# Patient Record
Sex: MALE | Race: WHITE | NOT HISPANIC OR LATINO | Employment: FULL TIME | URBAN - METROPOLITAN AREA
[De-identification: names, ages, dates, MRNs, and addresses within clinical notes are randomized per-mention and may not be internally consistent; named-entity substitution may affect disease eponyms.]

---

## 2017-09-25 ENCOUNTER — COMMUNICATION - HEALTHEAST (OUTPATIENT)
Dept: FAMILY MEDICINE | Facility: CLINIC | Age: 62
End: 2017-09-25

## 2018-02-26 ENCOUNTER — RECORDS - HEALTHEAST (OUTPATIENT)
Dept: ADMINISTRATIVE | Facility: OTHER | Age: 63
End: 2018-02-26

## 2018-03-01 ENCOUNTER — RECORDS - HEALTHEAST (OUTPATIENT)
Dept: ADMINISTRATIVE | Facility: OTHER | Age: 63
End: 2018-03-01

## 2018-03-05 ENCOUNTER — COMMUNICATION - HEALTHEAST (OUTPATIENT)
Dept: FAMILY MEDICINE | Facility: CLINIC | Age: 63
End: 2018-03-05

## 2018-03-15 ENCOUNTER — COMMUNICATION - HEALTHEAST (OUTPATIENT)
Dept: FAMILY MEDICINE | Facility: CLINIC | Age: 63
End: 2018-03-15

## 2018-03-15 ENCOUNTER — OFFICE VISIT - HEALTHEAST (OUTPATIENT)
Dept: FAMILY MEDICINE | Facility: CLINIC | Age: 63
End: 2018-03-15

## 2018-03-15 DIAGNOSIS — M19.90 DJD (DEGENERATIVE JOINT DISEASE): ICD-10-CM

## 2018-03-15 LAB
ANION GAP SERPL CALCULATED.3IONS-SCNC: 8 MMOL/L (ref 5–18)
BUN SERPL-MCNC: 18 MG/DL (ref 8–22)
CALCIUM SERPL-MCNC: 9.7 MG/DL (ref 8.5–10.5)
CHLORIDE BLD-SCNC: 104 MMOL/L (ref 98–107)
CO2 SERPL-SCNC: 26 MMOL/L (ref 22–31)
CREAT SERPL-MCNC: 0.81 MG/DL (ref 0.7–1.3)
ERYTHROCYTE [DISTWIDTH] IN BLOOD BY AUTOMATED COUNT: 11.3 % (ref 11–14.5)
GFR SERPL CREATININE-BSD FRML MDRD: >60 ML/MIN/1.73M2
GLUCOSE BLD-MCNC: 100 MG/DL (ref 70–125)
HCT VFR BLD AUTO: 45.5 % (ref 40–54)
HGB BLD-MCNC: 15.7 G/DL (ref 14–18)
INR PPP: 1.09 (ref 0.9–1.1)
MCH RBC QN AUTO: 32.6 PG (ref 27–34)
MCHC RBC AUTO-ENTMCNC: 34.6 G/DL (ref 32–36)
MCV RBC AUTO: 94 FL (ref 80–100)
PLATELET # BLD AUTO: 188 THOU/UL (ref 140–440)
PMV BLD AUTO: 6.8 FL (ref 7–10)
POTASSIUM BLD-SCNC: 4.2 MMOL/L (ref 3.5–5)
RBC # BLD AUTO: 4.82 MILL/UL (ref 4.4–6.2)
SODIUM SERPL-SCNC: 138 MMOL/L (ref 136–145)
WBC: 4.1 THOU/UL (ref 4–11)

## 2018-03-15 ASSESSMENT — MIFFLIN-ST. JEOR: SCORE: 1568.4

## 2018-03-28 ENCOUNTER — RECORDS - HEALTHEAST (OUTPATIENT)
Dept: ADMINISTRATIVE | Facility: OTHER | Age: 63
End: 2018-03-28

## 2018-05-14 ENCOUNTER — RECORDS - HEALTHEAST (OUTPATIENT)
Dept: LAB | Facility: CLINIC | Age: 63
End: 2018-05-14

## 2018-05-14 ENCOUNTER — RECORDS - HEALTHEAST (OUTPATIENT)
Dept: ADMINISTRATIVE | Facility: OTHER | Age: 63
End: 2018-05-14

## 2018-05-14 LAB
C REACTIVE PROTEIN LHE: 0.1 MG/DL (ref 0–0.8)
ERYTHROCYTE [SEDIMENTATION RATE] IN BLOOD BY WESTERGREN METHOD: 9 MM/HR (ref 0–15)

## 2018-05-15 ENCOUNTER — COMMUNICATION - HEALTHEAST (OUTPATIENT)
Dept: TELEHEALTH | Facility: CLINIC | Age: 63
End: 2018-05-15

## 2018-05-15 ENCOUNTER — HOSPITAL ENCOUNTER (OUTPATIENT)
Dept: ULTRASOUND IMAGING | Facility: CLINIC | Age: 63
Discharge: HOME | End: 2018-05-15
Attending: PHYSICIAN ASSISTANT

## 2018-05-15 DIAGNOSIS — R60.0 EDEMA LEG: ICD-10-CM

## 2018-05-23 ENCOUNTER — RECORDS - HEALTHEAST (OUTPATIENT)
Dept: ADMINISTRATIVE | Facility: OTHER | Age: 63
End: 2018-05-23

## 2018-06-28 ENCOUNTER — RECORDS - HEALTHEAST (OUTPATIENT)
Dept: ADMINISTRATIVE | Facility: OTHER | Age: 63
End: 2018-06-28

## 2018-07-05 ENCOUNTER — OFFICE VISIT - HEALTHEAST (OUTPATIENT)
Dept: FAMILY MEDICINE | Facility: CLINIC | Age: 63
End: 2018-07-05

## 2018-07-05 DIAGNOSIS — J81.0 ACUTE EDEMA OF LUNG (H): ICD-10-CM

## 2018-07-05 DIAGNOSIS — M79.2 NERVE PAIN: ICD-10-CM

## 2018-07-08 ENCOUNTER — COMMUNICATION - HEALTHEAST (OUTPATIENT)
Dept: FAMILY MEDICINE | Facility: CLINIC | Age: 63
End: 2018-07-08

## 2018-07-09 ENCOUNTER — COMMUNICATION - HEALTHEAST (OUTPATIENT)
Dept: FAMILY MEDICINE | Facility: CLINIC | Age: 63
End: 2018-07-09

## 2018-07-09 ENCOUNTER — COMMUNICATION - HEALTHEAST (OUTPATIENT)
Dept: ADMINISTRATIVE | Facility: CLINIC | Age: 63
End: 2018-07-09

## 2018-07-09 DIAGNOSIS — M79.606 CHRONIC LEG PAIN: ICD-10-CM

## 2018-07-09 DIAGNOSIS — G89.29 CHRONIC LEG PAIN: ICD-10-CM

## 2018-07-11 ENCOUNTER — COMMUNICATION - HEALTHEAST (OUTPATIENT)
Dept: FAMILY MEDICINE | Facility: CLINIC | Age: 63
End: 2018-07-11

## 2018-07-11 ENCOUNTER — OFFICE VISIT - HEALTHEAST (OUTPATIENT)
Dept: PHYSICAL THERAPY | Facility: REHABILITATION | Age: 63
End: 2018-07-11

## 2018-07-11 DIAGNOSIS — M25.552 LEFT HIP PAIN: ICD-10-CM

## 2018-07-11 DIAGNOSIS — M79.662 PAIN OF LEFT LOWER LEG: ICD-10-CM

## 2018-07-11 DIAGNOSIS — R26.89 ANTALGIC GAIT: ICD-10-CM

## 2018-07-11 DIAGNOSIS — M62.81 GENERALIZED MUSCLE WEAKNESS: ICD-10-CM

## 2018-07-12 ENCOUNTER — HOSPITAL ENCOUNTER (OUTPATIENT)
Dept: PALLIATIVE MEDICINE | Facility: OTHER | Age: 63
Discharge: HOME | End: 2018-07-12
Attending: PAIN MEDICINE | Admitting: PAIN MEDICINE

## 2018-07-12 DIAGNOSIS — G57.12 MERALGIA PARESTHETICA OF LEFT SIDE: ICD-10-CM

## 2018-07-12 DIAGNOSIS — G57.10 MERALGIA PARESTHETICA: ICD-10-CM

## 2018-07-12 ASSESSMENT — MIFFLIN-ST. JEOR: SCORE: 1540.61

## 2018-07-13 ENCOUNTER — COMMUNICATION - HEALTHEAST (OUTPATIENT)
Dept: PALLIATIVE MEDICINE | Facility: OTHER | Age: 63
End: 2018-07-13

## 2018-07-16 ENCOUNTER — COMMUNICATION - HEALTHEAST (OUTPATIENT)
Dept: FAMILY MEDICINE | Facility: CLINIC | Age: 63
End: 2018-07-16

## 2018-07-16 DIAGNOSIS — M79.2 NERVE PAIN: ICD-10-CM

## 2018-07-17 ENCOUNTER — COMMUNICATION - HEALTHEAST (OUTPATIENT)
Dept: FAMILY MEDICINE | Facility: CLINIC | Age: 63
End: 2018-07-17

## 2018-07-17 DIAGNOSIS — M79.2 NERVE PAIN: ICD-10-CM

## 2018-07-25 ENCOUNTER — OFFICE VISIT - HEALTHEAST (OUTPATIENT)
Dept: PHYSICAL THERAPY | Facility: REHABILITATION | Age: 63
End: 2018-07-25

## 2018-07-25 DIAGNOSIS — M62.81 GENERALIZED MUSCLE WEAKNESS: ICD-10-CM

## 2018-07-25 DIAGNOSIS — M25.552 LEFT HIP PAIN: ICD-10-CM

## 2018-07-25 DIAGNOSIS — R26.89 ANTALGIC GAIT: ICD-10-CM

## 2018-07-26 ENCOUNTER — OFFICE VISIT - HEALTHEAST (OUTPATIENT)
Dept: FAMILY MEDICINE | Facility: CLINIC | Age: 63
End: 2018-07-26

## 2018-07-26 DIAGNOSIS — G62.9 NEUROPATHY: ICD-10-CM

## 2018-07-26 ASSESSMENT — MIFFLIN-ST. JEOR: SCORE: 1536.36

## 2018-07-30 ENCOUNTER — OFFICE VISIT - HEALTHEAST (OUTPATIENT)
Dept: PHYSICAL THERAPY | Facility: REHABILITATION | Age: 63
End: 2018-07-30

## 2018-07-30 DIAGNOSIS — M62.81 GENERALIZED MUSCLE WEAKNESS: ICD-10-CM

## 2018-07-30 DIAGNOSIS — M25.552 LEFT HIP PAIN: ICD-10-CM

## 2018-07-30 DIAGNOSIS — R26.89 ANTALGIC GAIT: ICD-10-CM

## 2018-08-01 ENCOUNTER — COMMUNICATION - HEALTHEAST (OUTPATIENT)
Dept: FAMILY MEDICINE | Facility: CLINIC | Age: 63
End: 2018-08-01

## 2018-08-01 ENCOUNTER — AMBULATORY - HEALTHEAST (OUTPATIENT)
Dept: FAMILY MEDICINE | Facility: CLINIC | Age: 63
End: 2018-08-01

## 2018-08-01 DIAGNOSIS — J81.0 ACUTE EDEMA OF LUNG (H): ICD-10-CM

## 2018-08-06 ENCOUNTER — OFFICE VISIT - HEALTHEAST (OUTPATIENT)
Dept: PHYSICAL THERAPY | Facility: REHABILITATION | Age: 63
End: 2018-08-06

## 2018-08-06 DIAGNOSIS — M62.81 GENERALIZED MUSCLE WEAKNESS: ICD-10-CM

## 2018-08-06 DIAGNOSIS — R26.89 ANTALGIC GAIT: ICD-10-CM

## 2018-08-06 DIAGNOSIS — M25.552 LEFT HIP PAIN: ICD-10-CM

## 2018-08-09 ENCOUNTER — RECORDS - HEALTHEAST (OUTPATIENT)
Dept: ADMINISTRATIVE | Facility: OTHER | Age: 63
End: 2018-08-09

## 2018-09-02 ENCOUNTER — COMMUNICATION - HEALTHEAST (OUTPATIENT)
Dept: FAMILY MEDICINE | Facility: CLINIC | Age: 63
End: 2018-09-02

## 2018-09-05 ENCOUNTER — OFFICE VISIT - HEALTHEAST (OUTPATIENT)
Dept: PHYSICAL THERAPY | Facility: REHABILITATION | Age: 63
End: 2018-09-05

## 2018-09-05 DIAGNOSIS — M62.81 GENERALIZED MUSCLE WEAKNESS: ICD-10-CM

## 2018-09-05 DIAGNOSIS — R26.89 ANTALGIC GAIT: ICD-10-CM

## 2018-09-05 DIAGNOSIS — M25.552 LEFT HIP PAIN: ICD-10-CM

## 2018-09-24 ENCOUNTER — OFFICE VISIT - HEALTHEAST (OUTPATIENT)
Dept: FAMILY MEDICINE | Facility: CLINIC | Age: 63
End: 2018-09-24

## 2018-09-24 DIAGNOSIS — R60.0 LEG EDEMA, LEFT: ICD-10-CM

## 2018-09-24 DIAGNOSIS — G57.12 MERALGIA PARESTHETICA OF LEFT SIDE: ICD-10-CM

## 2018-09-24 DIAGNOSIS — L21.9 SEBORRHEIC DERMATITIS: ICD-10-CM

## 2018-09-24 LAB
ANION GAP SERPL CALCULATED.3IONS-SCNC: 7 MMOL/L (ref 5–18)
BUN SERPL-MCNC: 16 MG/DL (ref 8–22)
CALCIUM SERPL-MCNC: 9.7 MG/DL (ref 8.5–10.5)
CHLORIDE BLD-SCNC: 108 MMOL/L (ref 98–107)
CO2 SERPL-SCNC: 29 MMOL/L (ref 22–31)
CREAT SERPL-MCNC: 0.99 MG/DL (ref 0.7–1.3)
GFR SERPL CREATININE-BSD FRML MDRD: >60 ML/MIN/1.73M2
GLUCOSE BLD-MCNC: 96 MG/DL (ref 70–125)
POTASSIUM BLD-SCNC: 4 MMOL/L (ref 3.5–5)
SODIUM SERPL-SCNC: 144 MMOL/L (ref 136–145)

## 2018-09-25 ENCOUNTER — COMMUNICATION - HEALTHEAST (OUTPATIENT)
Dept: FAMILY MEDICINE | Facility: CLINIC | Age: 63
End: 2018-09-25

## 2018-09-25 DIAGNOSIS — L21.9 SEBORRHEIC DERMATITIS: ICD-10-CM

## 2018-09-27 ENCOUNTER — COMMUNICATION - HEALTHEAST (OUTPATIENT)
Dept: PALLIATIVE MEDICINE | Facility: CLINIC | Age: 63
End: 2018-09-27

## 2018-10-01 ENCOUNTER — COMMUNICATION - HEALTHEAST (OUTPATIENT)
Dept: FAMILY MEDICINE | Facility: CLINIC | Age: 63
End: 2018-10-01

## 2018-10-01 ENCOUNTER — COMMUNICATION - HEALTHEAST (OUTPATIENT)
Dept: PALLIATIVE MEDICINE | Facility: OTHER | Age: 63
End: 2018-10-01

## 2018-11-29 ENCOUNTER — RECORDS - HEALTHEAST (OUTPATIENT)
Dept: ADMINISTRATIVE | Facility: OTHER | Age: 63
End: 2018-11-29

## 2019-04-26 ENCOUNTER — COMMUNICATION - HEALTHEAST (OUTPATIENT)
Dept: FAMILY MEDICINE | Facility: CLINIC | Age: 64
End: 2019-04-26

## 2019-04-26 DIAGNOSIS — L21.9 SEBORRHEIC DERMATITIS: ICD-10-CM

## 2019-05-09 ENCOUNTER — RECORDS - HEALTHEAST (OUTPATIENT)
Dept: ADMINISTRATIVE | Facility: OTHER | Age: 64
End: 2019-05-09

## 2019-08-08 ENCOUNTER — COMMUNICATION - HEALTHEAST (OUTPATIENT)
Dept: FAMILY MEDICINE | Facility: CLINIC | Age: 64
End: 2019-08-08

## 2019-08-08 ENCOUNTER — OFFICE VISIT - HEALTHEAST (OUTPATIENT)
Dept: FAMILY MEDICINE | Facility: CLINIC | Age: 64
End: 2019-08-08

## 2019-08-08 DIAGNOSIS — K62.5 RECTAL BLEEDING: ICD-10-CM

## 2019-08-08 LAB
ANION GAP SERPL CALCULATED.3IONS-SCNC: 10 MMOL/L (ref 5–18)
BASOPHILS # BLD AUTO: 0 THOU/UL (ref 0–0.2)
BASOPHILS NFR BLD AUTO: 0 % (ref 0–2)
BUN SERPL-MCNC: 18 MG/DL (ref 8–22)
CHLORIDE BLD-SCNC: 103 MMOL/L (ref 98–107)
CO2 SERPL-SCNC: 28 MMOL/L (ref 22–31)
CREAT SERPL-MCNC: 1 MG/DL (ref 0.8–1.5)
EOSINOPHIL # BLD AUTO: 0.1 THOU/UL (ref 0–0.4)
EOSINOPHIL NFR BLD AUTO: 2 % (ref 0–6)
ERYTHROCYTE [DISTWIDTH] IN BLOOD BY AUTOMATED COUNT: 11.5 % (ref 11–14.5)
GLUCOSE BLD-MCNC: 109 MG/DL (ref 70–125)
HCT VFR BLD AUTO: 42.1 % (ref 40–54)
HGB BLD-MCNC: 14.4 G/DL (ref 14–18)
LYMPHOCYTES # BLD AUTO: 0.7 THOU/UL (ref 0.8–4.4)
LYMPHOCYTES NFR BLD AUTO: 13 % (ref 20–40)
MCH RBC QN AUTO: 32.7 PG (ref 27–34)
MCHC RBC AUTO-ENTMCNC: 34.3 G/DL (ref 32–36)
MCV RBC AUTO: 96 FL (ref 80–100)
MONOCYTES # BLD AUTO: 0.3 THOU/UL (ref 0–0.9)
MONOCYTES NFR BLD AUTO: 5 % (ref 2–10)
NEUTROPHILS # BLD AUTO: 4.3 THOU/UL (ref 2–7.7)
NEUTROPHILS NFR BLD AUTO: 80 % (ref 50–70)
PLATELET # BLD AUTO: 157 THOU/UL (ref 140–440)
PMV BLD AUTO: 6.9 FL (ref 7–10)
POTASSIUM BLD-SCNC: 3.8 MMOL/L (ref 3.5–5.5)
RBC # BLD AUTO: 4.41 MILL/UL (ref 4.4–6.2)
SODIUM SERPL-SCNC: 141 MMOL/L (ref 136–145)
WBC: 5.4 THOU/UL (ref 4–11)

## 2019-09-26 ENCOUNTER — RECORDS - HEALTHEAST (OUTPATIENT)
Dept: ADMINISTRATIVE | Facility: OTHER | Age: 64
End: 2019-09-26

## 2020-03-12 ENCOUNTER — OFFICE VISIT - HEALTHEAST (OUTPATIENT)
Dept: FAMILY MEDICINE | Facility: CLINIC | Age: 65
End: 2020-03-12

## 2020-03-12 DIAGNOSIS — L98.9 SKIN LESION: ICD-10-CM

## 2020-03-12 ASSESSMENT — MIFFLIN-ST. JEOR: SCORE: 1549.69

## 2020-06-05 ENCOUNTER — RECORDS - HEALTHEAST (OUTPATIENT)
Dept: ADMINISTRATIVE | Facility: OTHER | Age: 65
End: 2020-06-05

## 2020-06-05 ENCOUNTER — COMMUNICATION - HEALTHEAST (OUTPATIENT)
Dept: FAMILY MEDICINE | Facility: CLINIC | Age: 65
End: 2020-06-05

## 2020-06-05 DIAGNOSIS — M79.672 LEFT FOOT PAIN: ICD-10-CM

## 2020-06-05 DIAGNOSIS — M79.673 PAIN OF FOOT, UNSPECIFIED LATERALITY: ICD-10-CM

## 2020-06-23 ENCOUNTER — OFFICE VISIT - HEALTHEAST (OUTPATIENT)
Dept: PODIATRY | Facility: CLINIC | Age: 65
End: 2020-06-23

## 2020-06-23 DIAGNOSIS — M79.89 SWELLING OF LIMB: ICD-10-CM

## 2020-06-23 DIAGNOSIS — L57.0 KERATOMA: ICD-10-CM

## 2020-06-23 DIAGNOSIS — M21.6X2 PRONATION DEFORMITY OF BOTH FEET: ICD-10-CM

## 2020-06-23 DIAGNOSIS — M21.6X1 PRONATION DEFORMITY OF BOTH FEET: ICD-10-CM

## 2020-08-26 ENCOUNTER — COMMUNICATION - HEALTHEAST (OUTPATIENT)
Dept: FAMILY MEDICINE | Facility: CLINIC | Age: 65
End: 2020-08-26

## 2021-02-03 ENCOUNTER — RECORDS - HEALTHEAST (OUTPATIENT)
Dept: ADMINISTRATIVE | Facility: OTHER | Age: 66
End: 2021-02-03

## 2021-02-17 ENCOUNTER — RECORDS - HEALTHEAST (OUTPATIENT)
Dept: ADMINISTRATIVE | Facility: OTHER | Age: 66
End: 2021-02-17

## 2021-02-25 ENCOUNTER — RECORDS - HEALTHEAST (OUTPATIENT)
Dept: ADMINISTRATIVE | Facility: OTHER | Age: 66
End: 2021-02-25

## 2021-05-25 ENCOUNTER — RECORDS - HEALTHEAST (OUTPATIENT)
Dept: ADMINISTRATIVE | Facility: CLINIC | Age: 66
End: 2021-05-25

## 2021-05-27 VITALS
HEART RATE: 58 BPM | SYSTOLIC BLOOD PRESSURE: 142 MMHG | DIASTOLIC BLOOD PRESSURE: 70 MMHG | RESPIRATION RATE: 18 BRPM | TEMPERATURE: 97.7 F

## 2021-05-28 NOTE — TELEPHONE ENCOUNTER
RN cannot approve Refill Request    RN can NOT refill this medication med is not covered by policy/route to provider. Last office visit: 9/24/2018 Mercedes Elliott MD Last Physical: Visit date not found Last MTM visit: Visit date not found Last visit same specialty: 9/24/2018 Mercedes Elliott MD.  Next visit within 3 mo: Visit date not found  Next physical within 3 mo: Visit date not found      Carrie Boyer, [facility] Triage/Med Refill 4/27/2019    Requested Prescriptions   Pending Prescriptions Disp Refills     ketoconazole (NIZORAL) 2 % shampoo [Pharmacy Med Name: Ketoconazole External Shampoo 2 %] 120 mL 0     Sig: Apply to damp facial hair, lather, leave on 5 minutes, and rinse daily until symptoms resolved, then weekly       There is no refill protocol information for this order

## 2021-05-31 NOTE — PROGRESS NOTES
[facility]    CHIEF COMPLAINT/REASON FOR VISIT:  Chief Complaint   Patient presents with     Rectal Bleeding     started this morning       HISTORY:      HPI: Cristian is a 63 y.o. male who  has a past medical history of Chondromalacia of patella, Osteoarthrosis, unspecified whether generalized or localized, pelvic region and thigh, and Primary localized osteoarthrosis, lower leg. Cristian presents for evaluation of bright red rectal bleeding. He states he had a bowel movement this morning and when he wiped there was a moderate amount of bright red blood on the toilet paper. There was not any blood noted in the toilet water or on the stool. He has not had a history of rectal bleeding, anal fissure or hemorrhoids. No history of rectal or colon cancer. He has had colonscopy in the past and it was negative 5 years ago. He states there is not any pain. Bowel movements have been regular, he has not had any constipation. Bowel movements are daily. He has been watching what he eats--has been eating a lot of salads and does not eat breads. Cristian does occasionally take naproxen for musculoskeletal pains and aches. He did take some approximately 3 days ago. Cristian denies any other concerns including fevers/chills, cough or cold symptoms, headaches, vision changes, chest pain/pressure, difficulty breathing, SOB, abdominal pain, nausea, vomiting, diarrhea, dysuria, increasing weakness, increasing pain.     Past Medical History:   Diagnosis Date     Chondromalacia of patella      Osteoarthrosis, unspecified whether generalized or localized, pelvic region and thigh      Primary localized osteoarthrosis, lower leg              Family History   Problem Relation Age of Onset     COPD Brother      Alcohol abuse Brother      Drug abuse Brother      Coronary artery disease Father 54     Atrial fibrillation Father      Coronary artery disease Mother      Arthritis Mother      Arthritis Maternal Aunt      Arthritis  Sister      Social History     Socioeconomic History     Marital status:      Spouse name: None     Number of children: None     Years of education: None     Highest education level: None   Occupational History     None   Social Needs     Financial resource strain: None     Food insecurity:     Worry: None     Inability: None     Transportation needs:     Medical: None     Non-medical: None   Tobacco Use     Smoking status: Never Smoker     Smokeless tobacco: Never Used   Substance and Sexual Activity     Alcohol use: None     Drug use: None     Sexual activity: None   Lifestyle     Physical activity:     Days per week: None     Minutes per session: None     Stress: None   Relationships     Social connections:     Talks on phone: None     Gets together: None     Attends Confucianism service: None     Active member of club or organization: None     Attends meetings of clubs or organizations: None     Relationship status: None     Intimate partner violence:     Fear of current or ex partner: None     Emotionally abused: None     Physically abused: None     Forced sexual activity: None   Other Topics Concern     None   Social History Narrative     None       REVIEW OF SYSTEM:  Per HPI    PHYSICAL EXAM:   /64 (Patient Site: Right Arm, Patient Position: Sitting, Cuff Size: Adult Large)   Pulse 61   Temp 98.4  F (36.9  C) (Oral)   Resp 16   Wt 170 lb (77.1 kg)   SpO2 96%   BMI 25.85 kg/m    General appearance: alert, appears stated age and cooperative  HEENT: Head is normocephalic with normal hair distribution. No evidence of trauma. Ears: Without lesions or deformity. No acute purulent discharge. Eyes: Conjunctivae pink with no scleral icterus or erythema. Nose: Normal mucosa and septum. Oropharnyx: mmm, no lesions present.  Lungs: clear to auscultation bilaterally, respirations without effort  Heart: regular rate and rhythm, S1, S2 normal, no murmur, click, rub or gallop  Abdomen: soft, non-tender; bowel  sounds normal; no masses,  no organomegaly  Rectal Exam: normal anus--no fissures, hemorrhoids, skin tags. No evidence of internal hemorrhoids with digital examination. No blood or stool in rectum during rectal examination.   Extremities: extremities normal, atraumatic, no cyanosis or edema  Pulses: 2+ and symmetric  Skin: Skin color, texture, turgor normal. No rashes or lesions  Neurologic: Grossly normal   Psych: interacts well with caregivers, exhibits logical thought processes and connections, pleasant      LABS:   CBC, istat Chem 7  Guaiac     ASSESSMENT:      ICD-10-CM    1. Rectal bleeding K62.5        PLAN:    Rectal Bleeding  -CBC, Chem 7.   -Unable to collect Guaiac due to not having stool or blood in rectum.   -FOBT at next visit.   -Follow up in the next week, especially if there are any symptoms.     All questions answered to patient's satisfaction. No further questions posed, no comments or issues to report. Patient advised to return to primary care provider, urgent care or ER with any further complaints, issues or concerns.    Electronically signed by: Criss Macias CNP

## 2021-06-01 ENCOUNTER — RECORDS - HEALTHEAST (OUTPATIENT)
Dept: ADMINISTRATIVE | Facility: CLINIC | Age: 66
End: 2021-06-01

## 2021-06-01 VITALS — HEIGHT: 68 IN | BODY MASS INDEX: 25.62 KG/M2 | WEIGHT: 169.06 LBS

## 2021-06-01 VITALS — WEIGHT: 170 LBS | HEIGHT: 68 IN | BODY MASS INDEX: 25.76 KG/M2

## 2021-06-01 VITALS — WEIGHT: 176.13 LBS | BODY MASS INDEX: 26.69 KG/M2 | HEIGHT: 68 IN

## 2021-06-01 VITALS — WEIGHT: 172.6 LBS | BODY MASS INDEX: 26.24 KG/M2

## 2021-06-02 VITALS — BODY MASS INDEX: 26.94 KG/M2 | WEIGHT: 177.2 LBS

## 2021-06-03 VITALS — BODY MASS INDEX: 25.85 KG/M2 | WEIGHT: 170 LBS

## 2021-06-04 VITALS
DIASTOLIC BLOOD PRESSURE: 72 MMHG | HEIGHT: 68 IN | TEMPERATURE: 98 F | WEIGHT: 172 LBS | BODY MASS INDEX: 26.07 KG/M2 | SYSTOLIC BLOOD PRESSURE: 128 MMHG | HEART RATE: 52 BPM | OXYGEN SATURATION: 98 % | RESPIRATION RATE: 16 BRPM

## 2021-06-06 NOTE — PROGRESS NOTES
"Chief Complaint   Patient presents with     Foot Problem     Pt c/o poss calous on L foot, very painful       HPI: Patient presents with a growth on his left foot plantar surface.  There is 2 areas are causing difficulty and is been going on for over a month.  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    ROS: No foot pain other than at the plantar surface.  No paresthesias distally.    SH: Reviewed-see Snapshot for review.     FH: Reviewed-see Snapshot for review.    Meds:  Cristian currently has no medications in their medication list.    O:  /72   Pulse (!) 52   Temp 98  F (36.7  C)   Resp 16   Ht 5' 8\" (1.727 m)   Wt 172 lb (78 kg)   SpO2 98%   BMI 26.15 kg/m    Constitutional:    --Vitals as above  --No acute distress  Eyes-  --Sclera noninjected  --Lids and conjunctiva normal  Skin-  --Pink and dry except for the left foot plantar surface has 2 large areas of dry flaky deep lesions consistent with possible plantar wart but cannot rule out simple callus formation.    A/P:   1. Skin lesion  Both lesions on the foot treated with cryotherapy.  If not improving will refer to podiatry.    "

## 2021-06-09 NOTE — PROGRESS NOTES
FOOT AND ANKLE SURGERY/PODIATRY CONSULT NOTE         ASSESSMENT:   Intractable plantar keratoma left foot  Pronation deformity  Lymphedema both lower extremities      TREATMENT:  Debrided hyperkeratotic lesion left foot.  I have recommended orthotics.  Also recommended the patient begin to wear Jobst stockings to assist with his mild lymphedema.  He is to return to the clinic as needed.        HPI: I was asked to see Cristian Mercer today to evaluate and treat a painful wart on the bottom of the left foot.  The patient stated that his primary care physician did treat the area with liquid nitrogen.  Following the treatment the patient stated he had minimal improvement.  He has had this problem for several months.  The area is quite painful with weightbearing and ambulation.  He denies any trauma to the left foot.  He has not noticed any associated redness or swelling.  The pain is relieved with nonweightbearing.  The patient also complains of swelling involving the left foot..  The patient was seen in consultation at the request of Shashi Ba MD for evaluation and treatment of warts..     Past Medical History:   Diagnosis Date     Chondromalacia of patella      Osteoarthrosis, unspecified whether generalized or localized, pelvic region and thigh      Primary localized osteoarthrosis, lower leg        Past Surgical History:   Procedure Laterality Date     NC EXCIS CERV DISK,ONE LEVEL      Description: Laminectomy With Disc Removal;  Proc Date: 10/11/2001;     SPINE SURGERY         No Known Allergies    No current outpatient medications on file.    Family History   Problem Relation Age of Onset     COPD Brother      Alcohol abuse Brother      Drug abuse Brother      Coronary artery disease Father 54     Atrial fibrillation Father      Coronary artery disease Mother      Arthritis Mother      Arthritis Maternal Aunt      Arthritis Sister        Social History     Socioeconomic History     Marital status:       Spouse name: Not on file     Number of children: Not on file     Years of education: Not on file     Highest education level: Not on file   Occupational History     Not on file   Social Needs     Financial resource strain: Not on file     Food insecurity     Worry: Not on file     Inability: Not on file     Transportation needs     Medical: Not on file     Non-medical: Not on file   Tobacco Use     Smoking status: Never Smoker     Smokeless tobacco: Never Used   Substance and Sexual Activity     Alcohol use: Yes     Drug use: Never     Sexual activity: Not on file   Lifestyle     Physical activity     Days per week: Not on file     Minutes per session: Not on file     Stress: Not on file   Relationships     Social connections     Talks on phone: Not on file     Gets together: Not on file     Attends Taoist service: Not on file     Active member of club or organization: Not on file     Attends meetings of clubs or organizations: Not on file     Relationship status: Not on file     Intimate partner violence     Fear of current or ex partner: Not on file     Emotionally abused: Not on file     Physically abused: Not on file     Forced sexual activity: Not on file   Other Topics Concern     Not on file   Social History Narrative     Not on file       Review of Systems - Patient denies fever, chills, rash, wound, stiffness, limping, numbness, weakness, heart burn, blood in stool, chest pain with activity, calf pain when walking, shortness of breath with activity, chronic cough, easy bleeding/bruising, swelling of ankles, excessive thirst, fatigue, depression, anxiety.  Patient admits to painful wart left foot.      OBJECTIVE:  Appearance: alert, well appearing, and in no distress.    Vitals:    06/23/20 0853   BP: 142/70   Pulse: (!) 58   Resp: 18   Temp: 97.7  F (36.5  C)       BMI= There is no height or weight on file to calculate BMI.    General appearance: Patient is alert and fully cooperative with history &  exam.  No sign of distress is noted during the visit.  Psychiatric: Affect is pleasant & appropriate.  Patient appears motivated to improve health.  Respiratory: Breathing is regular & unlabored while sitting.  HEENT: Hearing is intact to spoken word.  Speech is clear.  No gross evidence of visual impairment that would impact ambulation.    Vascular: Dorsalis pedis and posterior tibial pulses are palpable. There is no pedal hair growth bilaterally.  CFT < 3 sec from anterior tibial surface to distal digits bilaterally. There is moderate  edema noted bilaterally.  Dermatologic: There is a small round raised hyperkeratotic nucleated lesion subsecond metatarsal head left foot.  No pinpoint bleeding noted at the base of this lesion.  There is pain on direct pressure of this lesion.  Turgor and texture are within normal limits. No coloration or temperature changes. No other primary or secondary lesions noted.  Neurologic: All epicritic and proprioceptive sensations are grossly intact bilaterally.  Musculoskeletal: All active and passive ankle, subtalar, midtarsal, and 1st MPJ range of motion are grossly intact without pain or crepitus, with the exception of none. Manual muscle strength is within normal limits bilaterally. All dorsiflexors, plantarflexors, invertors, evertors are intact bilaterally. Tenderness present to subsecond metatarsal head left foot on palpation.  No tenderness to both feet with range of motion. Calf is soft/non-tender wwithout warmth/induration    Imaging:     No results found.       Pietro Mendoza; PILI  [facility]/Podiatry

## 2021-06-09 NOTE — PATIENT INSTRUCTIONS - HE
Calluses, Corns, IPKs, Porokeratosis    When there is excessive friction or pressure on the skin, the body responds by making the skin thicker to protect the deeper structures from becoming exposed. While this works well to protect the deeper structures, the thickened skin can increase pressure and pain.    Flat, diffuse thickening are simple calluses and they are usually caused by friction. Often these are the result of rubbing on a shoe or going barefoot.    Calluses with a central core between the toes are called corns. These result from prominent joints on adjacent toes rubbing together. Theses are a symptom of bone malalignment and will always recur unless the underlying bones are addressed surgically.    Calluses with a central core on the ball of the foot are usually IPKs (intractable plantar keratosis). These are caused by excessive pressure from the metatarsals, the bones that make up the ball of the foot. Often one of these bones is too long or too prominent. Again, these will always recur unless the underlying bone issue is addressed. There is no cure for these. They will either go away by themselves, recur, or more could develop.    Regardless of the diagnosis, most of these lesions can be kept comfortable with routine maintenance.   1.This consists of filing them with a pumice stone or callus file a couple of times per week.   2. Lotion can be applied to soften the callus. A urea based cream such as Kerasal or Vanicream or thicker cream with shea butter are good options.  3. Toe spacers or toe covers can be used for corns, gel pads can be used for other lesions on the bottom of the foot.   If there is a surgical pathology noted, such as a prominent bone, often this needs to be addressed surgically to minimize recurrence. However, sometimes the lesion simply migrates to another spot after surgery, so it is not a guaranteed cure.     What are Prescription Custom Orthotics?  Custom orthotics are  specially-made devices designed to support and comfort your feet. Prescription orthotics are crafted for you and no one else. They match the contours of your feet precisely and are designed for the way you move. Orthotics are only manufactured after a podiatrist has conducted a complete evaluation of your feet, ankles, and legs, so the orthotic can accommodate your unique foot structure and pathology.  Prescription orthotics are divided into two categories:    Functional orthotics are designed to control abnormal motion. They may be used to treat foot pain caused by abnormal motion; they can also be used to treat injuries such as shin splints or tendinitis. Functional orthotics are usually crafted of a semi-rigid material such as plastic or graphite.    Accommodative orthotics are softer and meant to provide additional cushioning and support. They can be used to treat diabetic foot ulcers, painful calluses on the bottom of the foot, and other uncomfortable conditions.  Podiatrists use orthotics to treat foot problems such as plantar fasciitis, bursitis, tendinitis, diabetic foot ulcers, and foot, ankle, and heel pain. Clinical research studies have shown that podiatrist-prescribed foot orthotics decrease foot pain and improve function.  Orthotics typically cost more than shoe inserts purchased in a retail store, but the additional cost is usually well worth it. Unlike shoe inserts, orthotics are molded to fit each individual foot, so you can be sure that your orthotics fit and do what they're supposed to do. Prescription orthotics are also made of top-notch materials and last many years when cared for properly. Insurance often helps pay for prescription orthotics.  What are Shoe Inserts?   You've seen them at the grocery store and at the mall. You've probably even seen them on TV and online. Shoe inserts are any kind of non-prescription foot support designed to be worn inside a shoe. Pre-packaged, mass produced,  arch supports are shoe inserts. So are the  custom-made  insoles and foot supports that you can order online or at retail stores. Unless the device has been prescribed by a doctor and crafted for your specific foot, it's a shoe insert, not a custom orthotic device--despite what the ads might say.  Shoe inserts can be very helpful for a variety of foot ailments, including flat arches and foot and leg pain. They can cushion your feet, provide comfort, and support your arches, but they can't correct biomechanical foot problems or cure long-standing foot issues.  The most common types of shoe inserts are:    Arch supports: Some people have high arches. Others have low arches or flat feet. Arch supports generally have a  bumped-up  appearance and are designed to support the foot's natural arch.     Insoles: Insoles slip into your shoe to provide extra cushioning and support. Insoles are often made of gel, foam, or plastic.     Heel liners: Heel liners, sometimes called heel pads or heel cups, provide extra cushioning in the heel region. They may be especially useful for patients who have foot pain caused by age-related thinning of the heels' natural fat pads.     Foot cushions: Do your shoes rub against your heel or your toes? Foot cushions come in many different shapes and sizes and can be used as a barrier between you and your shoe.  Choosing an Over-the-Counter Shoe Insert  Selecting a shoe insert from the wide variety of devices on the market can be overwhelming. Here are some podiatrist-tested tips to help you find the insert that best meets your needs:    Consider your health. Do you have diabetes? Problems with circulation? An over-the-counter insert may not be your best bet. Diabetes and poor circulation increase your risk of foot ulcers and infections, so schedule an appointment with a podiatrist. He or she can help you select a solution that won't cause additional health problems.     Think about the purpose.  Are you planning to run a marathon, or do you just need a little arch support in your work shoes? Look for a product that fits your planned level of activity.     Bring your shoes. For the insert to be effective, it has to fit into your shoes. So bring your sneakers, dress shoes, or work boots--whatever you plan to wear with your insert. Look for an insert that will fit the contours of your shoe.     Try them on. If all possible, slip the insert into your shoe and try it out. Walk around a little. How does it feel? Don't assume that feelings of pressure will go away with continued wear. (If you can't try the inserts at the store, ask about the store's return policy and hold on to your receipt.)  [facility]  [address]  [place] [address]    [facility] [phone]  [phone]  [place] [phone]  [email or web address]   [address]             [address]                 [address]     [place] [address]      [place] [address]         [place] [address]  [phone]  [phone]     [phone]               Fax: [phone]  [email or web address]  [address] [place] [address]  [phone]  [phone]  [email or web address]    [facility]  [phone]  [email or web address]    [facility]   [phone]    [facility]  [address] [place] [address]    [phone]    [facility] will call you within the next 2 business day to schedule your appointment. If you would like to set it up sooner you may call them at one of the locations below.    Office Locations    [facility]  [address]  [address]  [place]  [address]  Phone: [phone]  Fax: [phone]    [facility]   [address]  [place] [address]  Phone: [phone]  Fax: [phone]    [facility]  [address]  [place] [address]  Phone: [phone]  Fax: [phone]    [facility]  [address]  [place] [address]  Phone: [phone]  Fax: [phone]    [facility]  [address]   [place] [address]   Phone: [phone]  Fax: [phone]    [place]   [address] [place] [address]   Phone: [phone]  Fax: [phone]    Podiatry Clinics that offer foot and toenail care  [facility]  [facility]  [phone]    [facility], [facility]  [phone]    [facility]  [place] - Dr. Moss on Tuesdays  [phone]    [facility]  [place]  [phone]    [facility]  [facility]  [phone]    [facility]  [phone]    [facility]  [phone]    [facility]  They have several locations and have a team of registered nurses that offer diabetic foot care.  They do not bill to insurance and the average cost per visit is $37.  [facility], [facility]  [phone]    [facility]  *Nurse comes to your home for nail care.  Sabrina Suárez RN Foot Specialist  [phone]

## 2021-06-16 PROBLEM — L21.9 SEBORRHEIC DERMATITIS: Status: ACTIVE | Noted: 2018-09-27

## 2021-06-16 PROBLEM — G57.12 MERALGIA PARESTHETICA OF LEFT SIDE: Status: ACTIVE | Noted: 2018-09-27

## 2021-06-16 PROBLEM — K62.5 RECTAL BLEEDING: Status: ACTIVE | Noted: 2019-08-08

## 2021-06-16 NOTE — PROGRESS NOTES
Preoperative Exam    62-year-old gentleman presents today for preoperative evaluation for left hip LORRAINE by Dr. elliott at the [facility] on 27 March 2018.  Patient has past history of right hip replacement with no complications.  He has had no complications from past surgeries, family history is negative for malignant hyperthermia or anesthesia reactions, and he is a Mallampati class II.  No contraindications to surgery found.    Scheduled Procedure: left hip replacement  Surgery Date:  03/27/18  Surgery Location: [facility], fax [phone]    Surgeon:  Dr Georges    Assessment/Plan:     1. DJD (degenerative joint disease)  --Pt has been optimized for surgery  - HM2(CBC w/o Differential)  - Basic Metabolic Panel  - INR     Surgical Procedure Risk: Low (reported cardiac risk generally < 1%)  Have you had prior anesthesia?: Yes  Have you or any family members had a previous anesthesia reaction:  No  Do you or any family members have a history of a clotting or bleeding disorder?: No  Cardiac Risk Assessment: no increased risk for major cardiac complications            Functional Status: Independent  Patient plans to recover at home with family.     Subjective:      Cristian Mercer is a 62 y.o. male who presents for a preoperative consultation.      All other systems reviewed and are negative, other than those listed in the HPI.    Pertinent History  Do you have difficulty breathing or chest pain after walking up a flight of stairs: No  History of obstructive sleep apnea: No  Steroid use in the last 6 months: No  Frequent Aspirin/NSAID use: No  Prior Blood Transfusion: No  Prior Blood Transfusion Reaction: No  If for some reason prior to, during or after the procedure, if it is medically indicated, would you be willing to have a blood transfusion?:  There is no transfusion refusal.    Current Outpatient Prescriptions   Medication Sig Dispense Refill      "HYDROcodone-acetaminophen 5-325 mg per tablet Take 1-2 tablets by mouth every 6 (six) hours as needed for pain. 30 tablet 0     meloxicam (MOBIC) 7.5 MG tablet TAKE 1-2 TABLETS (7.5-15 MG TOTAL) BY MOUTH DAILY AS NEEDED FOR PAIN. 60 tablet 2     naproxen sodium (ALEVE) 220 MG tablet Take 220 mg by mouth every 12 (twelve) hours as needed for pain.       No current facility-administered medications for this visit.    ROS: All 12 systems reviewed and are negative    No Known Allergies    Patient Active Problem List   Diagnosis     Localized Primary Osteoarthritis Of The Knee     Chronic hip pain, right     Intertrigo     Callus     Patellar Chondromalacia       Past Medical History:   Diagnosis Date     Chondromalacia of patella      Osteoarthrosis, unspecified whether generalized or localized, pelvic region and thigh      Primary localized osteoarthrosis, lower leg        Past Surgical History:   Procedure Laterality Date     WA EXCIS CERV DISK,ONE LEVEL      Description: Laminectomy With Disc Removal;  Proc Date: 10/11/2001;     SPINE SURGERY         Social History     Social History     Marital status:      Spouse name: N/A     Number of children: N/A     Years of education: N/A     Occupational History     Not on file.     Social History Main Topics     Smoking status: Never Smoker     Smokeless tobacco: Not on file     Alcohol use Not on file     Drug use: Not on file     Sexual activity: Not on file     Other Topics Concern     Not on file     Social History Narrative       Patient Care Team:  Stanton Ba MD as PCP - General (Family Medicine)          Objective:     Vitals:    03/15/18 0822   BP: 126/64   Pulse: 60   Resp: 14   Weight: 176 lb 2 oz (79.9 kg)   Height: 5' 8\" (1.727 m)         Physical Exam:  Constitutional:    --Vitals as above  --No acute distress  Eyes-  --Sclera noninjected  --Lids and conjunctiva normal  ENT-  --TMs clear  --Sclera noninjected  --Pharynx not " erythematous  Neck-  --Neck supple with no cervical lymphadenopathy  Lungs-  --Clear to Auscultation  Heart-  --Regular rate and rhythm  Abdomen--  --Soft, non-tender, non-distended  Skin-  --Pink and dry  Psych-  --Alert and oriented  --Normal affect        There are no Patient Instructions on file for this visit.        Labs:  Labs pending at this time.  Results will be reviewed when available.    Immunization History   Administered Date(s) Administered     DT (pediatric) 05/11/2006     Td,adult,historic,unspecified 05/11/2006     Tdap 09/06/2016     ZOSTER, LIVE 03/18/2016           Electronically signed by Stanton Ba MD 03/15/18 8:27 AM

## 2021-06-17 NOTE — PATIENT INSTRUCTIONS - HE
Patient Instructions by Criss Macias CNP at 8/8/2019 12:30 PM     Author: Criss Macias CNP Service: -- Author Type: Nurse Practitioner    Filed: 8/8/2019  1:27 PM Encounter Date: 8/8/2019 Status: Addendum    : Criss Macias CNP (Nurse Practitioner)    Related Notes: Original Note by Criss Macias CNP (Nurse Practitioner) filed at 8/8/2019  1:26 PM       Patient Education     Lower GI Bleeding (Stable)  You have signs of blood in your stool. This is called rectal bleeding. The bleeding may have begun in another part of your gastrointestinal (GI) tract. If the blood is bright red, it is likely coming from the lower part of the GI tract. If the blood is black or dark, it might be coming from higher up in the GI tract. Very small amounts of GI bleeding may not be visible and can only be discovered during a test on your stool. Possible causes of lower GI bleeding include:    Hemorrhoids    Anal fissures    Diverticulitis    Inflammatory bowel disease (Crohn's disease or ulcerative colitis)    Polyps (growths) in the intestine  Note: Iron supplements and medicines for diarrhea or upset stomach can cause black stools. Foods such as licorice and red beets can also discolor the stool and be mistaken for bleeding. These are not bleeding and are not a cause for alarm.  Home care  You have not lost a large amount of blood and your condition appears stable at this time. You may resume normal activity as long as you feel well.  Don't take NSAIDs, such as aspirin, ibuprofen, or naproxen. They can irritate the stomach and cause further bleeding. If you are taking these medicines for other medical reasons, talk to your healthcare provider before you stop them.   Follow-up care  Follow up with your healthcare provider as advised. Further tests may be needed to find the cause of your bleeding.  When to seek medical advice  Call your healthcare provider right away for any of the following:    Large amount of rectal  bleeding     Increasing abdominal pain    Weakness, dizziness  Call 911  Call 911 if any of the following occur:    Loss of consciousness    Vomiting blood  Date Last Reviewed: 6/24/2015 2000-2017 [employer]. [address] [place] [address], [place] [address]. All rights reserved. This information is not intended as a substitute for professional medical care. Always follow your healthcare professional's instructions.         Return to clinic in 1 week if there is not 100% resolution of symptoms.

## 2021-06-19 NOTE — PROGRESS NOTES
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  He was  seen by Dr. Georges with [facility] on 06/28/2018 and essentially Dr. Georges stated  that his repair went well, but he cannot understand cause of the paresthesias.  He  subsequently had an injection on 07/12/2018 by Dr. Mcleod and that note was  reviewed.  The patient states that the injection nerve block went well, but he is  now having a mild onset of increased pain particularly when he went on the  treadmill.  The pain is mild continuous and located in the anterior thigh.    REVIEW OF SYSTEMS:  Negative for distal paresthesias past the knee.  Negative for  instability of the hip.  Negative for fever.    Socially, he does not smoke.    OBJECTIVE:  Examination shows the patient is able to ambulate without difficulty.   Skin is pink and dry.    ASSESSMENT:  Neuropathy, left thigh.    PLAN:  1.  Increase gabapentin to 300 b.i.d.  2.  Return if not improving.      MD marisol ALONZO 07/26/2018 16:47:36  T 07/26/2018 18:32:23  R 07/26/2018 18:32:23  [id number]        cc:COSME DALTON MD

## 2021-06-19 NOTE — PROGRESS NOTES
"[facility] Daily Progress     Patient Name: Cristian Mercer  Date: 2018  Visit #: 2  PTA visit #:  1  Referral Diagnosis: [unfilled]  Referring provider: Stanton Ba MD  Visit Diagnosis:     ICD-10-CM    1. Left hip pain M25.552    2. Generalized muscle weakness M62.81    3. Antalgic gait R26.89          Assessment:   Pt returns today for his first follow up appointment.  Pt demonstrates decreased ROM L hip flexors/quadsd, HS and hip rotators.  Patient is benefitting from skilled physical therapy and is making steady progress toward functional goals.  Patient is appropriate to continue with skilled physical therapy intervention, as indicated by initial plan of care.    Goal Status:  Pt. will be independent with home exercise program in : 2 weeks  Pt will: perform >20 minutes light housework/yardwork without increased pain in 8 weeks  Pt will: stand >30 minutes without pain to improve WB activity and ADL's in 8 weeks  Pt will: ambulate >20 minutes indoors/outdoors at self selected speed without increased pain in 8 weeks    Plan / Patient Education:     Continue with initial plan of care.  Progress with home program as tolerated.   Pt to decrease to 30' on his stationary vs 45.' Pt to report back results of decreased time at his next visit.    Subjective:    \" I'm feeling better, my hip stiff.\"  Pt states he had an injection in hi L groin. Pt states he is still having numbness in his L groin area.  Pt has been compliant with his HEP.  Pt has been riding his stationary bike for 45 minutes. He does have some L groin pain the next day.  Pain Ratin        Objective:   Exercises:  Exercise #1: Supine hip flx stretch at EOB  Comment #1: HEP x 30 seconds x 2-3 reps  Exercise #2: Seated HS stretch- dugout stretch  Comment #2: HEP x 30 seconds x 2-3 reps  Exercise #3: Supine piriformis streth with towel (modified as pt is unable to achieve hip flx ROM for stretch)  Comment #3: HEP x 30 " "seconds x 2-3 reps B  Exercise #4: bridge  Comment #4: x5  Exercise #5: SLR  Comment #5: x10  Exercise #6: standing hip \"pulses\" stand on L LE perform hip AB with R  Comment #6: to fatique  Exercise #7: wall slides  Comment #7: x10  Exercise #8: L LE swings  Comment #8: x10        Treatment Today     TREATMENT MINUTES COMMENTS   Evaluation     Self-care/ Home management     Manual therapy     Neuromuscular Re-education     Therapeutic Activity     Therapeutic Exercises 25 See flow sheet  Discussed biking program, pt will decrease time on bike to 30'   Gait training     Modality__________________                Total 25    Blank areas are intentional and mean the treatment did not include these items.       Sendy Adams,EILEEN  7/25/2018      "

## 2021-06-19 NOTE — PROGRESS NOTES
[facility]   Hip Initial Evaluation    Patient Name: Cristian Mercer  Date of evaluation: 7/11/2018  Referral Diagnosis: Pain of left lower leg [M79.662]  - Primary   Referring provider: Stanton Ba MD  Visit Diagnosis:     ICD-10-CM    1. Left hip pain M25.552    2. Generalized muscle weakness M62.81    3. Antalgic gait R26.89        Assessment:   Cristian Mercer is a 62 y.o. male who presents to therapy today with chief complaints of L sided hip and thigh pain. Patient had LORRAINE performed in March 29th 2018 with Dr. Rachana Morin. His pain started approximately one month ago with insidious onset and has fluctuated off/on since. Pain is located anterior L thigh and is aggravated by ambulation, stairs, weight bearing activity. Evaluation today reveals: -ve lumbar spine screen, no palpable reproduction of pain, significant reduction in L hip PROM and AROM, antalgic gait, weakness of L LE. Patient's s/s in L thigh potentially radiating from L hip joint due to deficits in ROM and strength. Patient will benefit from 1:1 skilled physical therapy services to address the above limitations.    Goals:  Pt. will be independent with home exercise program in : 2 weeks  Pt will: perform >20 minutes light housework/yardwork without increased pain in 8 weeks  Pt will: stand >30 minutes without pain to improve WB activity and ADL's in 8 weeks  Pt will: ambulate >20 minutes indoors/outdoors at self selected speed without increased pain in 8 weeks    Patient's expectations/goals are realistic.    Barriers to Learning or Achieving Goals:  No Barriers.  Non- adherence to the home exercise program.  Missed appointments.       Plan / Patient Instructions:        Plan of Care:   Authorization / Certification Start Date: 07/11/18  Authorization / Certification Number of Visits: up to 8 visits   Communication with: Referral Source  Patient Related Instruction: Nature of Condition;Treatment plan and rationale;Self  Care instruction;Basis of treatment;Next steps;Expected outcome  Times per Week: 1-2x/week  Number of Weeks: up to 12 weeks   Number of Visits: up to 8 visits   Therapeutic Exercise: ROM;Stretching;Strengthening    Plan for next visit: consider anterior and lateral hip MFR, review HEP. Initiate strengthening.     Subjective:       Cristian Mercer is a 61 y/o male who presents today with L sided hip and thigh pain. Patient had LORRAINE performed in 2018 with Dr. Rachana Morin. His pain started approximately one month ago with insidious onset. Pain has fluctuated off and on since, and intensified to the point where pt presented to the ED on 18.   Patient had an x-ray taken in ED which showed fracture (see report for details). Per pt report, Dr. Georges does not believe this is causing his pain, as he had evidence of fracture previously.   Pt did not have PT post surgery. Pain is located anterior L thigh and does not radiate past his knee. Continues to have some anterior and lateral numbness related to incision. Pain is provoked by walking. He estimates he can't walk a block. Stairs provoke pain. Pt uses Ambien and tylenol at night. No pain at night. No pain with stationary biking.     Other PMH: R LORRAINE 2016, Hx of laminiecomty     Social information:   Living Situation:single family home   Occupation: Echo tech       Pain Ratin  Pain rating at best: 0  Pain rating at worst: 8  Pain description: aching and sharp    Patient reports benefit from:  rest         Objective:      Note: Items left blank indicates the item was not performed or not indicated at the time of the evaluation.    Patient Outcome Measures :    Lower Extremity Functional Scale (_/80): 36   Scores range from 0-80, where a score of 80 represents maximum function. The minimal clinically important difference is a positive change of 9 points.    Hip Examination  1. Left hip pain     2. Generalized muscle weakness     3. Antalgic gait        Precautions/Restrictions: L LORRAINE March 2018  Involved Side: Left    Posture Observation:   Standing:   Sitting: forward head, rounded shoulders     Gait Observation:   Antalgic gait decreased L stance time, increased hip Flx noted B    Lumbar assessment:  Flexion: full no pain  Extension: full no pain  R rotation: 20% loss no pain  L rotation: 20% loss no pain       Hip ROM:    Date:      Hip ROM( ) AROM in degrees AROM in degrees AROM in degrees    Right Left Right Left Right Left   Hip Flexion (0-120 )         Hip Abduction (0-45 )         Hip External Rotation (0-50 )         Hip Internal Rotation (0-40 )         Hip Extension (0-15 )          PROM in degrees PROM in degrees PROM in degrees    Right Left Right Left Right Left   Hip Flexion (0-120 )         Hip Abduction (0-45 )         Hip External Rotation (0-50 )         Hip Internal Rotation (0-40 )         Hip Extension (0-15 )                                       Hip/Knee Strength     Date:      Hip/Knee Strength (/5) MMT MMT MMT    Right Left Right Left Right Left   Hip Flexion 5 4       Hip Abduction         Hip Adduction         Hip Extension         Hip External Rotation         Hip Internal Rotation         Knee Extension 5 5       Knee Flexion             Flexibility:  Deo test:        R hip: IR/ER restricted   Flexion WNL  SLR to approx 70 degrees, mm tightness present     L hip: IR/ER severely restricted, flexion to approximately 100 degrees passively with tight end feel   Tightness horizontal adduction   SLR to approx 60 degrees mm tightness         Hip Special Tests  OA Right (+/-) Left (+/-) Intra Articular Right (+/-) Left (+/-)   Hip Scour   TOMMIE     Test Cluster  -Hip pain  -Hip IR <15   -Hip Flex <115    FADIR     Test Cluster  -Painful Hip IR  ->50 years old  -Morning Stiffness <60 min   Passive Supine Rotation Test     SIJ Right (+/-) Left (+/-) Lateral Rim Impingement     SIJ Compression   Other     SIJ Distraction   Other      POSH Test   Other     Sacral Thrust   Other           Palpation:      -ve pain quad, adductors, hip flexors, gluteal mm,   Slight tenderness mid IT band,.    No pain with hip flx isometric, quad isometric                   Treatment Today     TREATMENT MINUTES COMMENTS   Evaluation 25 Hip evaluation    Self-care/ Home management     Manual therapy     Neuromuscular Re-education     Therapeutic Activity     Therapeutic Exercises 25 Educated pt thoroughly on findings from valuation.   Initiated HEP:  Exercises:  Exercise #1: Supine hip flx stretch at EOB  Comment #1: HEP x 30 seconds x 2-3 reps  Exercise #2: Seated HS stretch- dugout stretch  Comment #2: HEP x 30 seconds x 2-3 reps  Exercise #3: Supine piriformis streth with towel (modified as pt is unable to achieve hip flx ROM for stretch)  Comment #3: HEP x 30 seconds x 2-3 reps B  Discussed how pain is potentially radiating from his hip joint, as he displays significantly reduced hip ROM.   Educated pt to continue biking as tolerated, up to 5-7 days/week on his stationary bike.    Gait training     Modality__________________                Total 50    Blank areas are intentional and mean the treatment did not include these items.     PT Evaluation Code: (Please list factors)  Patient History/Comorbidities: OA  Examination: see above   Clinical Presentation: stable  Clinical Decision Making: low    Patient History/  Comorbidities Examination  (body structures and functions, activity limitations, and/or participation restrictions) Clinical Presentation Clinical Decision Making (Complexity)   No documented Comorbidities or personal factors 1-2 Elements Stable and/or uncomplicated Low   1-2 documented comorbidities or personal factor 3 Elements Evolving clinical presentation with changing characteristics Moderate   3-4 documented comorbidities or personal factors 4 or more Unstable and unpredictable High                Luz Maria Brand  7/11/2018  11:04 AM

## 2021-06-19 NOTE — PROGRESS NOTES
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  The patient had left hip replacement by Dr. Georges in March of this  year and continues to have cutaneous pain that is sharp, intermittent and can last  up to 12 hours.  The pain is isolated only to the cutaneous portion of the anterior  portion of the left hip.  In addition, he continues to have occasional with  low-grade swelling in the left lower extremity.    REVIEW OF SYSTEMS:  Negative for fever.  Negative for weakness in the left lower  extremity.  Negative for paresthesias distally.    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    OBJECTIVE:  VITAL SIGNS:  110/60, pulse 60, respiration is 12.  EXTREMITIES:  Examination of the left lower extremity shows incision from hip  replacement on the left well-healed and there is an Ace wrap in place over the  cutaneous portion of the right thigh.  Normal calf.  Normal distal neurological and  vascular function of left lower extremity with 1+ edema.    ASSESSMENT:    1.  Edema mild.  2.  Cutaneous thigh pain.    PLAN:    1.  The patient has been in contact with Dr. Georges who feels the symptoms will  resolve with time.  We will cautiously give gabapentin 100 mg t.i.d. for nerve pain.  2.  Lasix 20 mg daily for the next week to try to reduce edema.  It is nominal but  it bothers him substantially.  The right lower extremity is normal, so I think this  is most likely all mechanical in nature which should resolve.  The patient's last  blood tests show no evidence of renal dysfunction.  Will start low-grade Lasix only.      MD marisol ALONZO 07/05/2018 11:48:21  T 07/05/2018 13:23:03  R 07/05/2018 13:23:03  [id number]        cc:COSME DALTON MD

## 2021-06-19 NOTE — PROGRESS NOTES
Injection - Other  Date/Time: 7/12/2018 11:35 AM  Performed by: MASON TANNER  Authorized by: MASON TANNER   Comments:     LATERAL FEMORAL CUTANEOUS NERVE BLOCK, left  Performed on: 7/12/2018    Mr. Mercer is a 62-year-old man who has pain in the anterior and lateral aspect of his left thigh.  He has had a left hip replacement about 4 months ago.  Over the last several weeks he has been having significant pain in the left thigh.  He went to the emergency room.  This was felt to possibly be a neuralgia paresthetica and he is referred here today for a trial of a lateral femoral cutaneous nerve block on the left.    Pre Procedure Diagnosis:  Meralgia paresthetica  Vital Signs:  As per intra-procedure documentation    The procedure was discussed with Cristian Mercer in detail along with the attendant risks, including but not limited to: nerve injury, infection, bleeding, allergic reaction, or worsening of pain.  Informed consent was obtained and patient elected to proceed.    Lateral femoral cutaneous nerve block on the left     The patient was placed supine on the examination table.  The left upper inguinal region was prepped sterilely with alcohol.  A 1   inch #27 gauge needle was used.  An injection was made about   inch medial to the anterior superior iliac spine and just below the inguinal ligament.  The injection was made in a fan like distribution and a total of 10 ml of 0.25% Marcaine with 10 mg of Decadron was used.    The patient tolerated the procedure well.  The patient was taken to the recovery area after the injection.  There were no complications.      Pre Procedure Pain Scale: 2  Pain Score: 0-No pain (Post procedure)    If Cristian Mercer has any questions or concerns after discharge, he was instructed to call us.

## 2021-06-19 NOTE — PROGRESS NOTES
"[facility] Daily Progress     Patient Name: Cristian Mercer  Date: 2018  Visit #: 3  PTA visit #:  2  Referral Diagnosis: [unfilled]  Referring provider: Stanton Ba MD  Visit Diagnosis:     ICD-10-CM    1. Left hip pain M25.552    2. Generalized muscle weakness M62.81    3. Antalgic gait R26.89          Assessment:     Patient is benefitting from skilled physical therapy and is making steady progress toward functional goals.  Patient is appropriate to continue with skilled physical therapy intervention, as indicated by initial plan of care.    Goal Status: Progressing towards  Pt. will be independent with home exercise program in : 2 weeks  Pt will: perform >20 minutes light housework/yardwork without increased pain in 8 weeks  Pt will: stand >30 minutes without pain to improve WB activity and ADL's in 8 weeks  Pt will: ambulate >20 minutes indoors/outdoors at self selected speed without increased pain in 8 weeks    Plan / Patient Education:     Continue with initial plan of care.  Progress with home program as tolerated.   Pt to see Dr. Georges next week.    Subjective:     Pain Ratin  Pt states his hip is doing better.  Pt states his Gabapentin has been increased. \" It helps.\"  Pt reports compliancy with his HEP.  Pt is going to just do swimming. He states he had increased pain when on the stationary bike. The pain occurs after he is done biking.  Pt states he is able to walk with out increased pain. He can walk for about 15-20 minutes with out increased pain.  Pt states his anterior thigh is painful if he sits too long.   He continues to have numbness in his L groin.      Objective:   Exercises:  Exercise #1: Supine hip flx stretch at EOB  Comment #1: HEP x 30 seconds x 2-3 reps  Exercise #2: Seated HS stretch- dugout stretch  Comment #2: HEP x 30 seconds x 2-3 reps  Exercise #3: Supine piriformis streth with towel (modified as pt is unable to achieve hip flx ROM for " "stretch)  Comment #3: HEP x 30 seconds x 2-3 reps B  Exercise #4: bridge  Comment #4: x5  Exercise #5: SLR  Comment #5: x10  Exercise #6: standing hip \"pulses\" stand on L LE perform hip AB with R  Comment #6: to fatique  Exercise #7: wall slides  Comment #7: x10  Exercise #8: L LE swings  Comment #8: x10        Treatment Today     TREATMENT MINUTES COMMENTS   Evaluation     Self-care/ Home management     Manual therapy 5 Brief MFR to L lateral thigh, pt R SL   Neuromuscular Re-education     Therapeutic Activity     Therapeutic Exercises 25 See flow sheet  Added to HEP:  -standing gastroc stretch 30\"   Gait training     Modality__________________                Total 25    Blank areas are intentional and mean the treatment did not include these items.       Sendy Adams,CLT  7/30/2018      "

## 2021-06-19 NOTE — PROGRESS NOTES
[facility] Daily Progress     Patient Name: Cristian Mercer  Date: 2018  Visit #: 4  PTA visit #:  2  Referral Diagnosis: Pain of left lower leg [M79.662]  - Primary   Referring provider: Stanton Ba MD  Visit Diagnosis:     ICD-10-CM    1. Left hip pain M25.552    2. Generalized muscle weakness M62.81    3. Antalgic gait R26.89          Assessment:   Patient returns to PT reporting nearly 85% improvement in pain. He has been mostly compliant with HEP. Plan to FU in 3 weeks to continue HEP progression, re-check ROM and goals.     Patient is benefitting from skilled physical therapy and is making steady progress toward functional goals.  Patient is appropriate to continue with skilled physical therapy intervention, as indicated by initial plan of care.    Goal Status: Progressing towards  Pt. will be independent with home exercise program in : 2 weeks  Pt will: perform >20 minutes light housework/yardwork without increased pain in 8 weeks (goal met)  Pt will: stand >30 minutes without pain to improve WB activity and ADL's in 8 weeks (goal met)  Pt will: ambulate >20 minutes indoors/outdoors at self selected speed without increased pain in 8 weeks (goal met)    Plan / Patient Education:     Continue with initial plan of care.  Progress with home program as tolerated.   Pt to see Dr. Georges next week.    Subjective:     Pain Ratin   Patient reports pain continues to improve. He has been taking gabapentin 300 mg 2x/day for 1-2 weeks.   He has relatively no pain when using gabapentin. Estimates 85% improvement in pain since starting PT.    Objective:     Pt guarded with L hip flexion, IR/ER PROM. Significantly limited but no pain.   No pain with passive hip flx and quad stretch at EOB today       Treatment Today     TREATMENT MINUTES COMMENTS   Evaluation     Self-care/ Home management     Manual therapy     Neuromuscular Re-education     Therapeutic Activity     Therapeutic Exercises 25  "Exercises reviewed and progressed today's date:  Exercise #1: Supine hip flx stretch at EOB  Comment #1: HEP x 30 seconds x 2-3 reps  Exercise #2: Seated HS stretch- dugout stretch  Comment #2: HEP x 30 seconds x 2-3 reps  Exercise #3: Supine piriformis streth with towel (modified as pt is unable to achieve hip flx ROM for stretch)  Comment #3: HEP x 30 seconds x 2-3 reps B  Exercise #4: Bridges  Comment #4: HEP x 5 seconds x 10-15 reps   Exercise #5: SLR  Comment #5: HEP x 10-15 reps - good demo today  Exercise #6: standing hip \"pulses\" stand on L LE perform hip AB with R  Comment #6: HEP to fatigue B  Exercise #7: wall slides  Comment #7: HEP x 10 reps x 2-3 sets   Exercise #8: L LE swings  Comment #8: HEP x 10 reps  Exercise #9: Clamshell  Comment #9: HEP no resistance x 20-30 reps B     Gait training     Modality__________________                Total 25    Blank areas are intentional and mean the treatment did not include these items.       Luz Maria Brand  PT, DPT  8/6/2018    "

## 2021-06-20 NOTE — PROGRESS NOTES
[facility] Daily Progress/Discharge Summary    Patient Name: Cristian Mercer  Date: 2018  Visit #: 5  PTA visit #:  2  Referral Diagnosis: Pain of left lower leg [M79.662]  - Primary   Referring provider: Stanton Ba MD  Visit Diagnosis:     ICD-10-CM    1. Left hip pain M25.552    2. Generalized muscle weakness M62.81    3. Antalgic gait R26.89          Assessment:   Patient has been seen for 5 PT visits since evaluation. His pain has fluctuated since last visit, with overall increase in pain since last seen. Pt did however reduce his gabapentin which may have contributed to increase in pain. HEP was advanced again today; pt continues to require cues for correct HEP demonstration and form. Plan to d/c from PT at this time, as pt's progress has plateaued and he will be returning to MD regarding recent increase in pain.    Goal Status:   Pt. will be independent with home exercise program in : 2 weeks (partially met)  Pt will: perform >20 minutes light housework/yardwork without increased pain in 8 weeks (goal met)  Pt will: stand >30 minutes without pain to improve WB activity and ADL's in 8 weeks (goal met)  Pt will: ambulate >20 minutes indoors/outdoors at self selected speed without increased pain in 8 weeks (goal met)    Plan / Patient Education:     D/c from PT.    Subjective:     Pain Ratin   Patient returns with slight increased thigh pain. He stopped taking gabapentin once per day.  Feels his pain has returned, but not as intense as evaluation. Discussed returning to MD regarding decrease in gabapentin as pt stopped this independently.     Objective:     Improved L hip mobility with piriformis stretch pt able to perform without modification    Treatment Today     TREATMENT MINUTES COMMENTS   Evaluation     Self-care/ Home management     Manual therapy     Neuromuscular Re-education     Therapeutic Activity     Therapeutic Exercises 25 Exercises reviewed and progressed today's  "date:  Exercise #1: Supine hip flx stretch at EOB  Comment #1: HEP x 30 seconds x 2-3 reps  Exercise #2: Seated HS stretch- dugout stretch  Comment #2: HEP x 30 seconds x 2-3 reps  Exercise #3: Supine piriformis streth with towel (modified as pt is unable to achieve hip flx ROM for stretch)  Comment #3: HEP x 30 seconds x 2-3 reps B  Exercise #4: Bridges with marching - advanced today   Comment #4: HEP x 3 seconds x 10 reps   Exercise #5: SLR  Comment #5: HEP x 10-15 reps - good demo today  Exercise #6: standing hip \"pulses\" stand on L LE perform hip AB with R  Comment #6: HEP to fatigue B  Exercise #7: wall slides  Comment #7: HEP x 10 reps x 2-3 sets   Exercise #8: L LE swings  Comment #8: HEP x 10 reps  Exercise #9: Clamshell  Comment #9: HEP L2 band x 20 reps, advanced today     Gait training     Modality__________________                Total 25    Blank areas are intentional and mean the treatment did not include these items.       Luz Maria Brand  PT, DPT  9/5/2018  "

## 2021-06-20 NOTE — PROGRESS NOTES
ASSESSMENT/PLAN:       1. Meralgia paresthetica of left side  -The patient does have symptoms consistent with meralgia paresthetica, which were thankfully easily relieved with a block.  Unfortunately they have returned now and he is wondering about long-term planning is going to look like for him.  Certainly for now I recommended continuing on the gabapentin as it does help make his day-to-day life much more tolerable, additionally discussed with the patient that I will discuss long-term planning with the pain clinic as they may have more ability to do more of a long-term block now that we know that it has worked.  He is overall comfortable with this and will let me know in the next week if he has not heard from me on follow-up.    2. Seborrheic dermatitis  -Symptoms and exam are consistent with seborrhea, I am having him start ketoconazole shampoo in the hair covered areas as well as cream in the non-hair covered areas.  If this does not help resolve his symptoms he will let me know and we can refer to dermatology for further evaluation and assessment.    3. Leg edema, left  -Discussed that this is likely secondary to postsurgical changes and for now I recommended continuing to compress the area as able, and I did provide him with a small prescription of furosemide as well as this is worked well for him in the past.  Updating labs today to ensure that there is still stable and plan on following up here in the next 4-6 months as needed.  Discussed watching for signs of hypotension given his normal blood pressure today and he will consider this.  - Basic Metabolic Panel  - furosemide (LASIX) 20 MG tablet; Take 1 tablet (20 mg total) by mouth daily.  Dispense: 30 tablet; Refill: 2      Mercedes Elliott MD      PROGRESS NOTE   9/24/2018    SUBJECTIVE:  Cristian Mercer is a 62 y.o. male  who presents for follow up.     He did have his left hip replaced in March. Since then he has had some nerve pain in the hip.  He does at times have some pain going down the leg. If he doesn't take the gabapentin the pain can be excruciating. He has been taking 300 mg of gabapentin, at bedtime and is hoping that this is safe long-term.  Overall he is frustrated as he does not feel that people are listening to him.  He did discuss his issues with orthopedics who reassured him that this is not unexpected and that he should do fine on the gabapentin.  He does find that the gabapentin helps a lot, however he is frustrated that he has to take a pill every day.  He did have an injection in the left femoral cutaneous nerve at the pain clinic back in July and he did have approximately 2 weeks of relief with this.  The pain has returned now, and he does not have a clear understanding of what the long-term plan to help with pain relief would be.  The pain is described as burning in nature, it does kind of start over the groin on the left and then radiates straight down the front thigh    Additionally he notes that he has had some left-sided foot swelling, and was provided some Lasix by Dr. Ba and that did help but he is now out of the prescription. He does wear compression socks when he is at work. He does use a foot massager as well, doesn't seem to help a ton.   Chief Complaint   Patient presents with     Post-op Problem     Patient is here today following up from his left hip replacement in March of 2018. Patient has been having a lot of stiffness and pain in the left hip a few months after surgery.      Skin Problem     Patient has been having facial break-outs (redness and dry) for a few months. Wonders about a referral for derm.          Patient Active Problem List   Diagnosis     Localized Primary Osteoarthritis Of The Knee     Chronic hip pain, right     Intertrigo     Callus     Patellar Chondromalacia     Meralgia paresthetica of left side     Seborrheic dermatitis       Current Outpatient Prescriptions   Medication Sig Dispense Refill      amoxicillin (AMOXIL) 500 MG capsule   12     gabapentin (NEURONTIN) 300 MG capsule Take 1 capsule (300 mg total) by mouth 2 (two) times a day. 180 capsule 2     zolpidem (AMBIEN) 10 mg tablet   2     furosemide (LASIX) 20 MG tablet Take 1 tablet (20 mg total) by mouth daily. 30 tablet 2     ketoconazole (NIZORAL) 2 % cream Apply two times a day to affected areas for up to four weeks then as needed 60 g 2     ketoconazole (NIZORAL) 2 % shampoo Apply to damp facial hair, lather, leave on 5 minutes, and rinse daily until symptoms resolved, then weekly 120 mL 1     No current facility-administered medications for this visit.        History   Smoking Status     Never Smoker   Smokeless Tobacco     Never Used           OBJECTIVE:        Recent Results (from the past 240 hour(s))   Basic Metabolic Panel   Result Value Ref Range    Sodium 144 136 - 145 mmol/L    Potassium 4.0 3.5 - 5.0 mmol/L    Chloride 108 (H) 98 - 107 mmol/L    CO2 29 22 - 31 mmol/L    Anion Gap, Calculation 7 5 - 18 mmol/L    Glucose 96 70 - 125 mg/dL    Calcium 9.7 8.5 - 10.5 mg/dL    BUN 16 8 - 22 mg/dL    Creatinine 0.99 0.70 - 1.30 mg/dL    GFR MDRD Af Amer >60 >60 mL/min/1.73m2    GFR MDRD Non Af Amer >60 >60 mL/min/1.73m2       Vitals:    09/24/18 1434   BP: 120/60   Patient Site: Left Arm   Patient Position: Sitting   Cuff Size: Adult Regular   Pulse: 85   SpO2: 96%   Weight: 177 lb 3.2 oz (80.4 kg)     Weight: 177 lb 3.2 oz (80.4 kg)        Physical Exam:  GENERAL APPEARANCE: A&A, NAD, well hydrated, well nourished  SKIN:  Normal skin turgor, scaling over the bridge of the nose, in the facial hair as well as his eyebrows with some well demarcated plaques with greasy scales  HEENT: moist mucous membranes, no rhinorrhea  NECK: Normal  CV: RRR, no M/G/R   LUNGS: CTAB  EXTREMITY: Trace edema in the left lower extremity, otherwise no tenderness to palpation over the bursa on the left hip, normal range of motion in the left hip and normal  strength  NEURO: no gross deficits

## 2021-07-04 ENCOUNTER — HEALTH MAINTENANCE LETTER (OUTPATIENT)
Age: 66
End: 2021-07-04

## 2021-12-10 ENCOUNTER — VIRTUAL VISIT (OUTPATIENT)
Dept: FAMILY MEDICINE | Facility: CLINIC | Age: 66
End: 2021-12-10
Payer: COMMERCIAL

## 2021-12-10 DIAGNOSIS — R20.2 PARESTHESIA: Primary | ICD-10-CM

## 2021-12-10 PROCEDURE — 99213 OFFICE O/P EST LOW 20 MIN: CPT | Mod: 95 | Performed by: FAMILY MEDICINE

## 2021-12-10 RX ORDER — GABAPENTIN 400 MG/1
400 CAPSULE ORAL AT BEDTIME
Qty: 90 CAPSULE | Refills: 3 | Status: SHIPPED | OUTPATIENT
Start: 2021-12-10 | End: 2022-01-18

## 2021-12-10 NOTE — PROGRESS NOTES
Cristian is a 66 year old who is being evaluated via a billable video visit.      How would you like to obtain your AVS? MyChart  If the video visit is dropped, the invitation should be resent by: [phone] Will anyone else be joining your video visit? No      Video Start Time: 1:25 PM    Assessment & Plan     Paresthesia  -Doing well with gabapentin, would like an updated prescription. Will renew the 400 mg daily for now.   -Discussed that his symptoms sound suspicious for carpal tunnel as well, he will brace the wrist and if that is not helping or if his symptoms are continuing I'd be happy to refer for EMG testing for diagnostic clarification.   - gabapentin (NEURONTIN) 400 MG capsule  Dispense: 90 capsule; Refill: 3               No follow-ups on file.    Mercedes Elliott MD  [facility]    He will try a brace and gabapentin, if not improving can refer for EMG.     Subjective   Cristian is a 66 year old who presents for the following health issues     History of Present Illness   He consumes 0 sweetened beverage(s) daily.He exercises with enough effort to increase his heart rate 9 or less minutes per day.  He exercises with enough effort to increase his heart rate 3 or less days per week.   He is taking medications regularly.     He has scheduled his appointment due to some neck pain. He has delaney having severe pain over the right neck. He did see orthopedics, Dr. Garrison and he did get an MRI and did get a medrol dose pack and thought he was doing ok. Now when he goes to bed at night about 4-5 hours later when he wakes up he has severe numbness and pain from that. He is trying to avoid any surgery. He has been doing PT via [facility] and has been taking gabapentin. He realized that he has been taking too low of a dose. He is wondering if he can et some gabapentin higher dose.     Last night he took 400 mg which is enough of what he would want to take. That worked well for him, didn't wake up  with the tingling. It's mainly in the middle finger. He is well during the daytime. He notes it mainly at night, will wake up 4-5 hours later.   He was told that he had stenosis in the neck. When he sleeps on his left side the numbness comes back more.       Review of Systems   Per above      Objective           Vitals:  No vitals were obtained today due to virtual visit.    Physical Exam   GENERAL: Healthy, alert and no distress  EYES: Eyes grossly normal to inspection.  No discharge or erythema, or obvious scleral/conjunctival abnormalities.  RESP: No audible wheeze, cough, or visible cyanosis.  No visible retractions or increased work of breathing.    SKIN: Visible skin clear. No significant rash, abnormal pigmentation or lesions.  NEURO: Cranial nerves grossly intact.  Mentation and speech appropriate for age.  PSYCH: Mentation appears normal, affect normal/bright, judgement and insight intact, normal speech and appearance well-groomed.            Video-Visit Details    Type of service:  Video Visit    Video End Time:1:33 PM    Originating Location (pt. Location): Other work    Distant Location (provider location):  [facility]     Platform used for Video Visit: [employer]

## 2021-12-19 ENCOUNTER — HEALTH MAINTENANCE LETTER (OUTPATIENT)
Age: 66
End: 2021-12-19

## 2022-01-21 ENCOUNTER — TRANSFERRED RECORDS (OUTPATIENT)
Dept: HEALTH INFORMATION MANAGEMENT | Facility: CLINIC | Age: 67
End: 2022-01-21
Payer: COMMERCIAL

## 2022-03-02 ENCOUNTER — OFFICE VISIT (OUTPATIENT)
Dept: FAMILY MEDICINE | Facility: CLINIC | Age: 67
End: 2022-03-02
Payer: COMMERCIAL

## 2022-03-02 VITALS
DIASTOLIC BLOOD PRESSURE: 70 MMHG | HEIGHT: 68 IN | HEART RATE: 57 BPM | BODY MASS INDEX: 26.52 KG/M2 | WEIGHT: 175 LBS | SYSTOLIC BLOOD PRESSURE: 120 MMHG | OXYGEN SATURATION: 98 %

## 2022-03-02 DIAGNOSIS — L98.9 SKIN LESION: ICD-10-CM

## 2022-03-02 DIAGNOSIS — B35.6 TINEA CRURIS: Primary | ICD-10-CM

## 2022-03-02 PROCEDURE — 99213 OFFICE O/P EST LOW 20 MIN: CPT | Performed by: PHYSICIAN ASSISTANT

## 2022-03-02 RX ORDER — KETOCONAZOLE 20 MG/G
CREAM TOPICAL DAILY
Qty: 30 G | Refills: 1 | Status: SHIPPED | OUTPATIENT
Start: 2022-03-02 | End: 2023-09-29

## 2022-03-02 RX ORDER — HYDROCORTISONE 25 MG/G
CREAM TOPICAL 2 TIMES DAILY PRN
Qty: 30 G | Refills: 1 | Status: CANCELLED | OUTPATIENT
Start: 2022-03-02

## 2022-03-02 NOTE — PROGRESS NOTES
Progress Note  3/02/22     Chief Complaint   Patient presents with     Derm Problem     rash in groin area, odor moist, OTC meds aren't helping      Lesion     on top of head wanted to have looked at          HPI    Cristian Mercer is a pleasant  66 year old year old male  who present to the clinic today for evaluation on a rash to his left groin. He has had this off and on for sometime. He has been applying OTC lotions and creams that seam to help a little but does not take care of it completely. He was using alcohol wipes a few days ago to try and dry out the area but this made things worse. Since then he has stopped using the alcohol wipes and it has improved. It has an odder to it at times. He tried a OTC antifungal cream one time but was not consistent with this to see if this helps. The area is itchy is he does not have some sort of cream to the area. No pain. He has not noted any signs of infection    He also had a spot on the top of his head that he has had for awhile as well. It is raised and will bleed at times. He has an appt with dermatology to have this evaluated coming up. It has not changed int he last few weeks.     ROS    Review of Systems   Constitutional: Negative for activity change, chills, fatigue and fever.   HENT: Negative.    Eyes: Negative.    Respiratory: Negative.    Cardiovascular: Negative.    Skin: Positive for rash.        Rash to left groin/scrotum  Lesion to top of head.    Neurological: Negative.             Patient Active Problem List   Diagnosis     Localized Primary Osteoarthritis Of The Knee     Chronic hip pain, right     Intertrigo     Callus     Patellar Chondromalacia     Meralgia paresthetica of left side     Seborrheic dermatitis     Rectal bleeding     Tinea cruris     Skin lesion        Past Medical History:   Diagnosis Date     Chondromalacia of patella      Osteoarthrosis, unspecified whether generalized or localized, pelvic region and thigh      Primary localized  "osteoarthrosis, lower leg           Social History     Socioeconomic History     Marital status:      Spouse name: Not on file     Number of children: Not on file     Years of education: Not on file     Highest education level: Not on file   Occupational History     Not on file   Tobacco Use     Smoking status: Never Smoker     Smokeless tobacco: Never Used   Substance and Sexual Activity     Alcohol use: Yes     Drug use: Never     Sexual activity: Not on file   Other Topics Concern     Not on file   Social History Narrative     Not on file     Social Determinants of Health     Financial Resource Strain: Not on file   Food Insecurity: Not on file   Transportation Needs: Not on file   Physical Activity: Not on file   Stress: Not on file   Social Connections: Not on file   Intimate Partner Violence: Not on file   Housing Stability: Not on file         No Known Allergies       Current Outpatient Medications   Medication     ketoconazole (NIZORAL) 2 % external cream     gabapentin (NEURONTIN) 400 MG capsule     No current facility-administered medications for this visit.            /70   Pulse 57   Ht 1.727 m (5' 8\")   Wt 79.4 kg (175 lb)   SpO2 98%   BMI 26.61 kg/m       No results found for this or any previous visit (from the past 240 hour(s)).     Physical Exam:     Physical Exam  Vitals and nursing note reviewed.   Constitutional:       Appearance: Normal appearance.   HENT:      Head: Normocephalic and atraumatic.   Eyes:      Conjunctiva/sclera: Conjunctivae normal.   Cardiovascular:      Rate and Rhythm: Normal rate.      Heart sounds: No murmur heard.    No friction rub. No gallop.   Pulmonary:      Effort: Pulmonary effort is normal.      Breath sounds: Normal breath sounds. No wheezing, rhonchi or rales.   Musculoskeletal:      Cervical back: Neck supple.   Skin:     General: Skin is warm.      Findings: Lesion and rash present.      Comments: No redness, warmth, or rash noted to the left " groin on exam today.     Raised skin lesion to the posterior aspect of head. Nonpainful. No bleeding    Neurological:      Mental Status: He is alert.          Assessment/Plan:       ICD-10-CM    1. Tinea cruris  B35.6 ketoconazole (NIZORAL) 2 % external cream   2. Skin lesion  L98.9      1.Taty Cruris- I do not see much of a rash today. The area was very moist. I wonder I this is a mild fungal infection. He is to trial ketoconazole to the area for 7-14 days. He is to also try and keep the area clean and dry.     2. Skin lesion- Skin lesion on the posterior aspect of head looks consistent with a Suberic Keratosis. He has an appt with dermatology. Encouraged him to keep this appt for evaluation.         Martin Ventura PA-C

## 2022-03-04 PROBLEM — L98.9 SKIN LESION: Status: ACTIVE | Noted: 2022-03-04

## 2022-03-04 PROBLEM — B35.6 TINEA CRURIS: Status: ACTIVE | Noted: 2022-03-04

## 2022-03-04 ASSESSMENT — ENCOUNTER SYMPTOMS
RESPIRATORY NEGATIVE: 1
FEVER: 0
CHILLS: 0
FATIGUE: 0
CARDIOVASCULAR NEGATIVE: 1
NEUROLOGICAL NEGATIVE: 1
EYES NEGATIVE: 1
ACTIVITY CHANGE: 0

## 2022-07-31 ENCOUNTER — HEALTH MAINTENANCE LETTER (OUTPATIENT)
Age: 67
End: 2022-07-31

## 2022-08-01 ENCOUNTER — E-VISIT (OUTPATIENT)
Dept: FAMILY MEDICINE | Facility: CLINIC | Age: 67
End: 2022-08-01
Payer: COMMERCIAL

## 2022-08-01 DIAGNOSIS — J01.90 ACUTE SINUSITIS, RECURRENCE NOT SPECIFIED, UNSPECIFIED LOCATION: Primary | ICD-10-CM

## 2022-08-01 PROCEDURE — 99421 OL DIG E/M SVC 5-10 MIN: CPT | Performed by: FAMILY MEDICINE

## 2022-08-01 NOTE — PATIENT INSTRUCTIONS
Sinusitis (Antibiotic Treatment)    The sinuses are air-filled spaces within the bones of the face. They connect to the inside of the nose. Sinusitis is an inflammation of the tissue that lines the sinuses. Sinusitis can occur during a cold. It can also happen due to allergies to pollens and other particles in the air. Sinusitis can cause symptoms of sinus congestion and a feeling of fullness. A sinus infection causes fever, headache, and facial pain. There is often green or yellow fluid draining from the nose or into the back of the throat (post-nasal drip). You have been given antibiotics to treat this condition.   Home care    Take the full course of antibiotics as instructed. Don't stop taking them, even when you feel better.    Drink plenty of water, hot tea, and other liquids as directed by the healthcare provider. This may help thin nasal mucus. It also may help your sinuses drain fluids.    Heat may help soothe painful areas of your face. Use a towel soaked in hot water. Or,  the shower and direct the warm spray onto your face. Using a vaporizer along with a menthol rub at night may also help soothe symptoms.     An expectorant with guaifenesin may help thin nasal mucus and help your sinuses drain fluids. Talk with your provider or pharmacists before taking an over-the-counter (OTC) medicine if you have any questions about it or its side effects..    You can use an OTC decongestant, unless a similar medicine was prescribed to you. Nasal sprays work the fastest. Use one that contains phenylephrine or oxymetazoline. First blow your nose gently. Then use the spray. Don't use these medicines more often than directed on the label. If you do, your symptoms may get worse. You may also take pills that contain pseudoephedrine. Don t use products that combine multiple medicines. This is because side effects may be increased. Read labels. You can also ask the pharmacist for help. (People with high blood  pressure should not use decongestants. They can raise blood pressure.) Talk with your provider or pharmacist if you have any questions about the medicine..    OTC antihistamines may help if allergies contributed to your sinusitis. Talk with your provider or pharmacist if you have any questions about the medicine..    Don't use nasal rinses or irrigation during an acute sinus infection, unless your healthcare provider tells you to. Rinsing may spread the infection to other areas in your sinuses.    Use acetaminophen or ibuprofen to control pain, unless another pain medicine was prescribed to you. If you have chronic liver or kidney disease or ever had a stomach ulcer, talk with your healthcare provider before using these medicines. Never give aspirin to anyone under age 18 who is ill with a fever. It may cause severe liver damage.    Don't smoke. This can make symptoms worse.    Follow-up care  Follow up with your healthcare provider, or as advised.   When to seek medical advice  Call your healthcare provider if any of these occur:     Facial pain or headache that gets worse    Stiff neck    Unusual drowsiness or confusion    Swelling of your forehead or eyelids    Symptoms don't go away in 10 days    Vision problems, such as blurred or double vision    Fever of 100.4 F (38 C) or higher, or as directed by your healthcare provider  Call 911  Call 911 if any of these occur:     Seizure    Trouble breathing    Feeling dizzy or faint    Fingernails, skin or lips look blue, purple , or gray  Prevention  Here are steps you can take to help prevent an infection:     Keep good hand washing habits.    Don t have close contact with people who have sore throats, colds, or other upper respiratory infections.    Don t smoke, and stay away from secondhand smoke.    Stay up to date with of your vaccines.  [employer] last reviewed this educational content on 12/1/2019 2000-2021 [employer]. All rights reserved. This  information is not intended as a substitute for professional medical care. Always follow your healthcare professional's instructions.        Dear Cristian Mercer    After reviewing your responses, I've been able to diagnose you with?a sinus infection. Generally they do not require antibiotics until you are sick for 7-10 days. I'd recommend using nasal saline, mucinex per the instructions on the box and if not improving in the next week you can start the antibiotics. .?     Based on your responses and diagnosis, I have prescribed Augmentin to treat your symptoms. I have sent this to your pharmacy.? As stated above, I'd recommend starting this in about 7 days if things aren't improving.     It is also important to stay well hydrated, get lots of rest and take over-the-counter decongestants,?tylenol?or ibuprofen if you?are able to?take those medications per your primary care provider to help relieve discomfort.?     It is important that you take?all of?your prescribed medication even if your symptoms are improving after a few doses.? Taking?all of?your medicine helps prevent the symptoms from returning.?     If your symptoms worsen, you develop severe headache, vomiting, high fever (>102), or are not improving in 7 days, please contact your primary care provider for an appointment or visit any of our convenient Walk-in Care or Urgent Care Centers to be seen which can be found on our website?here.?     Thanks again for choosing?us?as your health care partner,?   ?  Mercedes Elliott MD?

## 2022-08-01 NOTE — TELEPHONE ENCOUNTER
Provider E-Visit time total (minutes): 4      Assessment:   The primary encounter diagnosis was (J01.90) Acute sinusitis, recurrence not specified, unspecified location  (primary encounter diagnosis)  Comment:   Plan: amoxicillin-clavulanate (AUGMENTIN) 875-125 MG         tablet              Plan:   No orders of the defined types were placed in this encounter.      Patient Instructions       Sinusitis (Antibiotic Treatment)    The sinuses are air-filled spaces within the bones of the face. They connect to the inside of the nose. Sinusitis is an inflammation of the tissue that lines the sinuses. Sinusitis can occur during a cold. It can also happen due to allergies to pollens and other particles in the air. Sinusitis can cause symptoms of sinus congestion and a feeling of fullness. A sinus infection causes fever, headache, and facial pain. There is often green or yellow fluid draining from the nose or into the back of the throat (post-nasal drip). You have been given antibiotics to treat this condition.   Home care    Take the full course of antibiotics as instructed. Don't stop taking them, even when you feel better.    Drink plenty of water, hot tea, and other liquids as directed by the healthcare provider. This may help thin nasal mucus. It also may help your sinuses drain fluids.    Heat may help soothe painful areas of your face. Use a towel soaked in hot water. Or,  the shower and direct the warm spray onto your face. Using a vaporizer along with a menthol rub at night may also help soothe symptoms.     An expectorant with guaifenesin may help thin nasal mucus and help your sinuses drain fluids. Talk with your provider or pharmacists before taking an over-the-counter (OTC) medicine if you have any questions about it or its side effects..    You can use an OTC decongestant, unless a similar medicine was prescribed to you. Nasal sprays work the fastest. Use one that contains phenylephrine or  oxymetazoline. First blow your nose gently. Then use the spray. Don't use these medicines more often than directed on the label. If you do, your symptoms may get worse. You may also take pills that contain pseudoephedrine. Don t use products that combine multiple medicines. This is because side effects may be increased. Read labels. You can also ask the pharmacist for help. (People with high blood pressure should not use decongestants. They can raise blood pressure.) Talk with your provider or pharmacist if you have any questions about the medicine..    OTC antihistamines may help if allergies contributed to your sinusitis. Talk with your provider or pharmacist if you have any questions about the medicine..    Don't use nasal rinses or irrigation during an acute sinus infection, unless your healthcare provider tells you to. Rinsing may spread the infection to other areas in your sinuses.    Use acetaminophen or ibuprofen to control pain, unless another pain medicine was prescribed to you. If you have chronic liver or kidney disease or ever had a stomach ulcer, talk with your healthcare provider before using these medicines. Never give aspirin to anyone under age 18 who is ill with a fever. It may cause severe liver damage.    Don't smoke. This can make symptoms worse.    Follow-up care  Follow up with your healthcare provider, or as advised.   When to seek medical advice  Call your healthcare provider if any of these occur:     Facial pain or headache that gets worse    Stiff neck    Unusual drowsiness or confusion    Swelling of your forehead or eyelids    Symptoms don't go away in 10 days    Vision problems, such as blurred or double vision    Fever of 100.4 F (38 C) or higher, or as directed by your healthcare provider  Call 911  Call 911 if any of these occur:     Seizure    Trouble breathing    Feeling dizzy or faint    Fingernails, skin or lips look blue, purple , or gray  Prevention  Here are steps you can  take to help prevent an infection:     Keep good hand washing habits.    Don t have close contact with people who have sore throats, colds, or other upper respiratory infections.    Don t smoke, and stay away from secondhand smoke.    Stay up to date with of your vaccines.  [employer] last reviewed this educational content on 12/1/2019 2000-2021 [employer]. All rights reserved. This information is not intended as a substitute for professional medical care. Always follow your healthcare professional's instructions.        Dear Cristian Mercer    After reviewing your responses, I've been able to diagnose you with?a sinus infection. Generally they do not require antibiotics until you are sick for 7-10 days. I'd recommend using nasal saline, mucinex per the instructions on the box and if not improving in the next week you can start the antibiotics. .?     Based on your responses and diagnosis, I have prescribed Augmentin to treat your symptoms. I have sent this to your pharmacy.? As stated above, I'd recommend starting this in about 7 days if things aren't improving.     It is also important to stay well hydrated, get lots of rest and take over-the-counter decongestants,?tylenol?or ibuprofen if you?are able to?take those medications per your primary care provider to help relieve discomfort.?     It is important that you take?all of?your prescribed medication even if your symptoms are improving after a few doses.? Taking?all of?your medicine helps prevent the symptoms from returning.?     If your symptoms worsen, you develop severe headache, vomiting, high fever (>102), or are not improving in 7 days, please contact your primary care provider for an appointment or visit any of our convenient Walk-in Care or Urgent Care Centers to be seen which can be found on our website?here.?     Thanks again for choosing?us?as your health care partner,?   ?  Mercedes Elliott MD?       Subjective:   Cristian Mercer is a  66 year old male who submitted an eVisit request for evaluation of   Chief Complaint   Patient presents with     Sinus Problem     Entered automatically based on patient selection in [employer].       See the questionnaire and message section of encounter report for information related to history of present illness and review of systems.    Portions of the patient's history were reviewed and updated as appropriate.     Objective:   No exam performed today, patient submitted as eVisit.

## 2022-10-05 ENCOUNTER — E-VISIT (OUTPATIENT)
Dept: FAMILY MEDICINE | Facility: CLINIC | Age: 67
End: 2022-10-05
Payer: COMMERCIAL

## 2022-10-05 DIAGNOSIS — R05.8 ALLERGIC COUGH: Primary | ICD-10-CM

## 2022-10-05 PROCEDURE — 99421 OL DIG E/M SVC 5-10 MIN: CPT | Performed by: FAMILY MEDICINE

## 2022-10-06 RX ORDER — FLUTICASONE PROPIONATE 50 MCG
2 SPRAY, SUSPENSION (ML) NASAL DAILY
Qty: 18.2 ML | Refills: 3 | Status: SHIPPED | OUTPATIENT
Start: 2022-10-06

## 2022-10-06 NOTE — TELEPHONE ENCOUNTER
Provider E-Visit time total (minutes): 4      Assessment:   The primary encounter diagnosis was (R05.8) Allergic cough  (primary encounter diagnosis)  Comment:   Plan: fluticasone (FLONASE) 50 MCG/ACT nasal spray              Plan:   No orders of the defined types were placed in this encounter.      Patient Instructions     Dear Cristian HERBERT Ruslan    After reviewing your responses, I've been able to diagnose you with likely an allergic cough. Cough due to allergy is caused by mucus from the back of your nose which can travel down the back of your throat and irritate your lungs. This causes swelling and irritation of air passages and causes a cough. Exposure to pollens or dust or cigarette smoke can worsen this.     To treat allergic cough, the main goal is to avoid the triggers if you know what they are and the can be avoided and to treat the nasal congestion that causes coughing. This can be done with allergy medications including antihistamine pills, nasal sprays, and decongestants, many of which are available over the counter.     I have sent flonase to the pharmacy you indicated. This often works well for the post-nasal drip that you are experiencing. The other great option is doing nasal saline sinus rinses or a nettipot.     If your symptoms worsen or are not improving in 4 days, please contact your primary care provider for an appointment or visit any of our convenient Walk-in Care or Urgent Care Centers to be seen which can be found on our website here.    Thanks again for choosing us as your health care partner,    Mercedes Elliott MD      Subjective:   Cristian Mercer is a 66 year old male who submitted an eVisit request for evaluation of   Chief Complaint   Patient presents with     Cough     Entered automatically based on patient selection in [employer].       See the questionnaire and message section of encounter report for information related to history of present illness and review of systems.    Portions  of the patient's history were reviewed and updated as appropriate.     Objective:   No exam performed today, patient submitted as eVisit.

## 2022-10-06 NOTE — PATIENT INSTRUCTIONS
Dear Cristian Mercer    After reviewing your responses, I've been able to diagnose you with likely an allergic cough. Cough due to allergy is caused by mucus from the back of your nose which can travel down the back of your throat and irritate your lungs. This causes swelling and irritation of air passages and causes a cough. Exposure to pollens or dust or cigarette smoke can worsen this.     To treat allergic cough, the main goal is to avoid the triggers if you know what they are and the can be avoided and to treat the nasal congestion that causes coughing. This can be done with allergy medications including antihistamine pills, nasal sprays, and decongestants, many of which are available over the counter.     I have sent flonase to the pharmacy you indicated. This often works well for the post-nasal drip that you are experiencing. The other great option is doing nasal saline sinus rinses or a nettipot.     If your symptoms worsen or are not improving in 4 days, please contact your primary care provider for an appointment or visit any of our convenient Walk-in Care or Urgent Care Centers to be seen which can be found on our website here.    Thanks again for choosing us as your health care partner,    Mercedes Elliott MD

## 2022-10-15 ENCOUNTER — HEALTH MAINTENANCE LETTER (OUTPATIENT)
Age: 67
End: 2022-10-15

## 2023-02-22 ENCOUNTER — APPOINTMENT (OUTPATIENT)
Dept: URBAN - METROPOLITAN AREA CLINIC 260 | Age: 68
Setting detail: DERMATOLOGY
End: 2023-02-22

## 2023-02-22 VITALS — HEIGHT: 66 IN | WEIGHT: 175 LBS

## 2023-02-22 DIAGNOSIS — L20.89 OTHER ATOPIC DERMATITIS: ICD-10-CM

## 2023-02-22 DIAGNOSIS — Z71.89 OTHER SPECIFIED COUNSELING: ICD-10-CM

## 2023-02-22 DIAGNOSIS — L57.0 ACTINIC KERATOSIS: ICD-10-CM

## 2023-02-22 PROBLEM — L20.84 INTRINSIC (ALLERGIC) ECZEMA: Status: ACTIVE | Noted: 2023-02-22

## 2023-02-22 PROCEDURE — OTHER PRESCRIPTION MEDICATION MANAGEMENT: OTHER

## 2023-02-22 PROCEDURE — OTHER LIQUID NITROGEN: OTHER

## 2023-02-22 PROCEDURE — 17000 DESTRUCT PREMALG LESION: CPT

## 2023-02-22 PROCEDURE — OTHER COUNSELING: OTHER

## 2023-02-22 PROCEDURE — OTHER MIPS QUALITY: OTHER

## 2023-02-22 PROCEDURE — OTHER ADDITIONAL NOTES: OTHER

## 2023-02-22 PROCEDURE — 99203 OFFICE O/P NEW LOW 30 MIN: CPT | Mod: 25

## 2023-02-22 PROCEDURE — OTHER PRESCRIPTION: OTHER

## 2023-02-22 PROCEDURE — OTHER SUNSCREEN RECOMMENDATIONS: OTHER

## 2023-02-22 RX ORDER — CLOBETASOL PROPIONATE 0.5 MG/G
CREAM TOPICAL TWICE DAILY
Qty: 60 | Refills: 2 | Status: ERX | COMMUNITY
Start: 2023-02-22

## 2023-02-22 ASSESSMENT — LOCATION DETAILED DESCRIPTION DERM
LOCATION DETAILED: MID-OCCIPITAL SCALP
LOCATION DETAILED: RIGHT POSTERIOR SHOULDER
LOCATION DETAILED: LEFT POSTERIOR SHOULDER
LOCATION DETAILED: LEFT ANTERIOR PROXIMAL THIGH
LOCATION DETAILED: RIGHT ANTERIOR PROXIMAL THIGH

## 2023-02-22 ASSESSMENT — LOCATION ZONE DERM
LOCATION ZONE: SCALP
LOCATION ZONE: ARM
LOCATION ZONE: LEG

## 2023-02-22 ASSESSMENT — LOCATION SIMPLE DESCRIPTION DERM
LOCATION SIMPLE: LEFT SHOULDER
LOCATION SIMPLE: POSTERIOR SCALP
LOCATION SIMPLE: RIGHT SHOULDER
LOCATION SIMPLE: RIGHT THIGH
LOCATION SIMPLE: LEFT THIGH

## 2023-02-22 NOTE — PROCEDURE: LIQUID NITROGEN
Render Note In Bullet Format When Appropriate: No
Render Post-Care Instructions In Note?: yes
Detail Level: Detailed
Number Of Freeze-Thaw Cycles: 2 freeze-thaw cycles
Post-Care Instructions: I reviewed with the patient in detail post-care instructions. Patient is to wear sunprotection, and avoid picking at any of the treated lesions. Pt may apply Vaseline to crusted or scabbing areas.
Consent: The patient's consent was obtained including but not limited to risks of crusting, scabbing, blistering, scarring, darker or lighter pigmentary change, recurrence, incomplete removal and infection.
Duration Of Freeze Thaw-Cycle (Seconds): 3

## 2023-02-22 NOTE — PROCEDURE: MIPS QUALITY
Quality 431: Preventive Care And Screening: Unhealthy Alcohol Use - Screening: Patient not identified as an unhealthy alcohol user when screened for unhealthy alcohol use using a systematic screening method
Detail Level: Detailed
Quality 130: Documentation Of Current Medications In The Medical Record: Current Medications Documented
Quality 226: Preventive Care And Screening: Tobacco Use: Screening And Cessation Intervention: Patient screened for tobacco use and is an ex/non-smoker
Quality 111:Pneumonia Vaccination Status For Older Adults: Pneumococcal vaccine (PPSV23) was not administered on or after patient’s 60th birthday and before the end of the measurement period, reason not otherwise specified
Quality 110: Preventive Care And Screening: Influenza Immunization: Influenza Immunization Administered during Influenza season

## 2023-02-22 NOTE — PROCEDURE: PRESCRIPTION MEDICATION MANAGEMENT
Plan: Recheck if not resolved in 2-3 weeks. If well controlled follow up for refills in a year
Render In Strict Bullet Format?: No
Initiate Treatment: clobetasol
Detail Level: Zone

## 2023-02-22 NOTE — PROCEDURE: ADDITIONAL NOTES
Mom called back.  Please try again. Please call twice in a row if she doesn't answer the first time.  It doesn't ring long enough for her to answer unless she's holding on to it.  
Detail Level: Detailed
Additional Notes: Recommend patient be seen for FBE or at least head, scalp, and neck check annually also encouraged patient to wear wide brim hat to reduce sun exposure on scalp.
Render Risk Assessment In Note?: no

## 2023-03-26 ENCOUNTER — HEALTH MAINTENANCE LETTER (OUTPATIENT)
Age: 68
End: 2023-03-26

## 2023-09-29 DIAGNOSIS — B35.6 TINEA CRURIS: ICD-10-CM

## 2023-09-29 RX ORDER — KETOCONAZOLE 20 MG/G
CREAM TOPICAL DAILY
Qty: 30 G | Refills: 0 | Status: SHIPPED | OUTPATIENT
Start: 2023-09-29

## 2023-12-05 ENCOUNTER — MYC REFILL (OUTPATIENT)
Dept: FAMILY MEDICINE | Facility: CLINIC | Age: 68
End: 2023-12-05
Payer: COMMERCIAL

## 2023-12-05 DIAGNOSIS — R20.2 PARESTHESIA: ICD-10-CM

## 2023-12-05 RX ORDER — GABAPENTIN 400 MG/1
400 CAPSULE ORAL 2 TIMES DAILY
Qty: 180 CAPSULE | Refills: 0 | OUTPATIENT
Start: 2023-12-05

## 2024-04-04 ENCOUNTER — TRANSFERRED RECORDS (OUTPATIENT)
Dept: HEALTH INFORMATION MANAGEMENT | Facility: CLINIC | Age: 69
End: 2024-04-04
Payer: COMMERCIAL

## 2024-05-26 ENCOUNTER — HEALTH MAINTENANCE LETTER (OUTPATIENT)
Age: 69
End: 2024-05-26

## 2024-05-28 ENCOUNTER — TRANSFERRED RECORDS (OUTPATIENT)
Dept: HEALTH INFORMATION MANAGEMENT | Facility: CLINIC | Age: 69
End: 2024-05-28
Payer: COMMERCIAL

## 2024-05-30 ENCOUNTER — TRANSFERRED RECORDS (OUTPATIENT)
Dept: HEALTH INFORMATION MANAGEMENT | Facility: CLINIC | Age: 69
End: 2024-05-30
Payer: COMMERCIAL

## 2024-07-12 ENCOUNTER — TRANSFERRED RECORDS (OUTPATIENT)
Dept: HEALTH INFORMATION MANAGEMENT | Facility: CLINIC | Age: 69
End: 2024-07-12
Payer: COMMERCIAL

## 2024-07-26 ENCOUNTER — TRANSFERRED RECORDS (OUTPATIENT)
Dept: HEALTH INFORMATION MANAGEMENT | Facility: CLINIC | Age: 69
End: 2024-07-26
Payer: COMMERCIAL

## 2024-09-06 ENCOUNTER — TRANSFERRED RECORDS (OUTPATIENT)
Dept: HEALTH INFORMATION MANAGEMENT | Facility: CLINIC | Age: 69
End: 2024-09-06
Payer: COMMERCIAL

## 2024-09-20 ENCOUNTER — MYC MEDICAL ADVICE (OUTPATIENT)
Dept: FAMILY MEDICINE | Facility: CLINIC | Age: 69
End: 2024-09-20
Payer: COMMERCIAL

## 2024-09-20 ENCOUNTER — TELEPHONE (OUTPATIENT)
Dept: FAMILY MEDICINE | Facility: CLINIC | Age: 69
End: 2024-09-20
Payer: COMMERCIAL

## 2024-09-20 NOTE — TELEPHONE ENCOUNTER
Patient called back to the clinic to find out when he should retest for COVID-19 infection.    Writer accessed the CDC website for recommendations regarding COVID-19 testing and relayed to the patient the CDC recommendations for resting-see website below:    https://www.cdc.gov/covid/testing/index.html    Patient verbalized understanding and agrees with the plan.    Denies other questions or concerns at this time.    Sachi Calderon RN, BSN  [facility]

## 2024-09-20 NOTE — TELEPHONE ENCOUNTER
Patient contacted to discuss paxlovid and symptoms. Patient chose at this point not to pursue a prescription as he is feeling generally well at this point.    Clovis Sinha RN  [facility]

## 2024-10-15 ENCOUNTER — E-VISIT (OUTPATIENT)
Dept: FAMILY MEDICINE | Facility: CLINIC | Age: 69
End: 2024-10-15
Payer: COMMERCIAL

## 2024-10-15 DIAGNOSIS — R19.7 DIARRHEA, UNSPECIFIED TYPE: Primary | ICD-10-CM

## 2024-10-15 PROCEDURE — 99207 PR NON-BILLABLE SERV PER CHARTING: CPT | Performed by: PHYSICIAN ASSISTANT

## 2024-10-16 NOTE — PATIENT INSTRUCTIONS
Thank you for choosing us for your care. I think an in-clinic or Urgent care visit would be the best next step based on your symptoms. Please schedule a clinic appointment; you won t be charged for this eVisit.      You can schedule an appointment by clicking here in [employer], or call [phone].  Diarrhea: Care Instructions  Overview     Diarrhea is loose, watery stools (bowel movements). The exact cause is often hard to find. Sometimes diarrhea is your body's way of getting rid of what caused an upset stomach. Viruses, food poisoning, and many medicines can cause diarrhea. Some people get diarrhea in response to emotional stress, anxiety, or certain foods.  Almost everyone has diarrhea now and then. It usually isn't serious, and your stools will return to normal soon. The important thing to do is replace the fluids you have lost, so you can prevent dehydration.  The doctor has checked you carefully, but problems can develop later. If you notice any problems or new symptoms, get medical treatment right away.  Follow-up care is a key part of your treatment and safety. Be sure to make and go to all appointments, and call your doctor if you are having problems. It's also a good idea to know your test results and keep a list of the medicines you take.  How can you care for yourself at home?  Watch for signs of dehydration, which means your body has lost too much water. Dehydration is a serious condition and should be treated right away. Signs of dehydration are:  Increasing thirst and dry eyes and mouth.  Feeling faint or lightheaded.  A smaller amount of urine than normal.  To prevent dehydration, drink plenty of fluids. Choose water and other clear liquids until you feel better. If you have kidney, heart, or liver disease and have to limit fluids, talk with your doctor before you increase the amount of fluids you drink.  When you feel like eating, start with small amounts of food.  The doctor may recommend that you  "take over-the-counter medicine, such as loperamide (Imodium). Read and follow all instructions on the label. Do not use this medicine if you have bloody diarrhea, a high fever, or other signs of serious illness. Call your doctor if you think you are having a problem with your medicine.  When should you call for help?   Call 911 anytime you think you may need emergency care. For example, call if:    You passed out (lost consciousness).     Your stools are maroon or very bloody.   Call your doctor now or seek immediate medical care if:    You are dizzy or lightheaded, or you feel like you may faint.     Your stools are black and look like tar, or they have streaks of blood.     You have new or worse belly pain.     You have symptoms of dehydration, such as:  Dry eyes and a dry mouth.  Passing only a little urine.  Cannot keep fluids down.     You have a new or higher fever.   Watch closely for changes in your health, and be sure to contact your doctor if:    Your diarrhea is getting worse.     You see pus in the diarrhea.     You are not getting better after 2 days (48 hours).   Where can you learn more?  Go to https://www.[employer].net/patiented  Enter W335 in the search box to learn more about \"Diarrhea: Care Instructions.\"  Current as of: October 19, 2023  Content Version: 14.2 2024 [facility] [employer].   Care instructions adapted under license by your healthcare professional. If you have questions about a medical condition or this instruction, always ask your healthcare professional. [employer] disclaims any warranty or liability for your use of this information.    "

## 2025-06-10 ENCOUNTER — TRANSFERRED RECORDS (OUTPATIENT)
Dept: HEALTH INFORMATION MANAGEMENT | Facility: CLINIC | Age: 70
End: 2025-06-10
Payer: COMMERCIAL

## 2025-06-14 ENCOUNTER — HEALTH MAINTENANCE LETTER (OUTPATIENT)
Age: 70
End: 2025-06-14